# Patient Record
Sex: MALE | Race: WHITE
[De-identification: names, ages, dates, MRNs, and addresses within clinical notes are randomized per-mention and may not be internally consistent; named-entity substitution may affect disease eponyms.]

---

## 2018-03-15 ENCOUNTER — HOSPITAL ENCOUNTER (OUTPATIENT)
Dept: HOSPITAL 53 - M SMT | Age: 53
End: 2018-03-15
Attending: NURSE PRACTITIONER
Payer: COMMERCIAL

## 2018-03-15 ENCOUNTER — HOSPITAL ENCOUNTER (OUTPATIENT)
Dept: HOSPITAL 53 - M SMT | Age: 53
End: 2018-03-15
Attending: INTERNAL MEDICINE
Payer: COMMERCIAL

## 2018-03-15 DIAGNOSIS — I50.9: Primary | ICD-10-CM

## 2018-03-15 DIAGNOSIS — G47.33: Primary | ICD-10-CM

## 2018-03-15 LAB
ALBUMIN/GLOBULIN RATIO: 0.81 (ref 1–1.93)
ALBUMIN: 3.4 GM/DL (ref 3.2–5.2)
ALKALINE PHOSPHATASE: 80 U/L (ref 45–117)
ALT SERPL W P-5'-P-CCNC: 100 U/L (ref 12–78)
ANION GAP: 6 MEQ/L (ref 8–16)
AST SERPL-CCNC: 73 U/L (ref 7–37)
BILIRUBIN,TOTAL: 0.5 MG/DL (ref 0.2–1)
BLOOD UREA NITROGEN: 15 MG/DL (ref 7–18)
CALCIUM LEVEL: 8.8 MG/DL (ref 8.5–10.1)
CARBON DIOXIDE LEVEL: 32 MEQ/L (ref 21–32)
CHLORIDE LEVEL: 100 MEQ/L (ref 98–107)
CREATININE FOR GFR: 0.99 MG/DL (ref 0.7–1.3)
GFR SERPL CREATININE-BSD FRML MDRD: > 60 ML/MIN/{1.73_M2} (ref 56–?)
GLUCOSE, FASTING: 100 MG/DL (ref 70–100)
HEMATOCRIT: 49 % (ref 42–52)
HEMOGLOBIN: 16.1 G/DL (ref 14–18)
MEAN CORPUSCULAR HEMOGLOBIN: 30.6 PG (ref 27–33)
MEAN CORPUSCULAR HGB CONC: 32.9 G/DL (ref 32–36.5)
MEAN CORPUSCULAR VOLUME: 93 FL (ref 80–96)
NRBC BLD AUTO-RTO: 0 % (ref 0–0)
NT-PRO BNP: 38 PG/ML (ref ?–125)
PLATELET COUNT, AUTOMATED: 230 10^3/UL (ref 150–450)
POTASSIUM SERUM: 4.6 MEQ/L (ref 3.5–5.1)
RED BLOOD COUNT: 5.27 10^6/UL (ref 4.3–6.1)
RED CELL DISTRIBUTION WIDTH: 13.4 % (ref 11.5–14.5)
SODIUM LEVEL: 138 MEQ/L (ref 136–145)
TOTAL PROTEIN: 7.6 GM/DL (ref 6.4–8.2)
WHITE BLOOD COUNT: 6.7 10^3/UL (ref 4–10)

## 2018-03-27 ENCOUNTER — HOSPITAL ENCOUNTER (OUTPATIENT)
Dept: HOSPITAL 53 - M SLEEP | Age: 53
End: 2018-03-27
Attending: INTERNAL MEDICINE
Payer: COMMERCIAL

## 2018-03-27 DIAGNOSIS — G47.33: Primary | ICD-10-CM

## 2018-03-27 PROCEDURE — 95811 POLYSOM 6/>YRS CPAP 4/> PARM: CPT

## 2020-10-07 ENCOUNTER — HOSPITAL ENCOUNTER (INPATIENT)
Dept: HOSPITAL 53 - M ED | Age: 55
LOS: 8 days | Discharge: HOME HEALTH | DRG: 133 | End: 2020-10-15
Attending: INTERNAL MEDICINE | Admitting: INTERNAL MEDICINE
Payer: SELF-PAY

## 2020-10-07 VITALS — DIASTOLIC BLOOD PRESSURE: 65 MMHG | SYSTOLIC BLOOD PRESSURE: 103 MMHG

## 2020-10-07 VITALS — WEIGHT: 315 LBS | HEIGHT: 70 IN | BODY MASS INDEX: 45.1 KG/M2

## 2020-10-07 VITALS — SYSTOLIC BLOOD PRESSURE: 109 MMHG | DIASTOLIC BLOOD PRESSURE: 59 MMHG

## 2020-10-07 VITALS — SYSTOLIC BLOOD PRESSURE: 142 MMHG | DIASTOLIC BLOOD PRESSURE: 90 MMHG

## 2020-10-07 VITALS — DIASTOLIC BLOOD PRESSURE: 71 MMHG | SYSTOLIC BLOOD PRESSURE: 132 MMHG

## 2020-10-07 DIAGNOSIS — D86.9: ICD-10-CM

## 2020-10-07 DIAGNOSIS — R91.8: ICD-10-CM

## 2020-10-07 DIAGNOSIS — Z20.828: ICD-10-CM

## 2020-10-07 DIAGNOSIS — Z99.81: ICD-10-CM

## 2020-10-07 DIAGNOSIS — I50.9: ICD-10-CM

## 2020-10-07 DIAGNOSIS — J96.01: Primary | ICD-10-CM

## 2020-10-07 DIAGNOSIS — E66.2: ICD-10-CM

## 2020-10-07 DIAGNOSIS — E83.39: ICD-10-CM

## 2020-10-07 DIAGNOSIS — I11.0: ICD-10-CM

## 2020-10-07 DIAGNOSIS — J96.02: ICD-10-CM

## 2020-10-07 DIAGNOSIS — Z91.19: ICD-10-CM

## 2020-10-07 LAB
ALBUMIN SERPL BCG-MCNC: 3.1 GM/DL (ref 3.2–5.2)
ALT SERPL W P-5'-P-CCNC: 35 U/L (ref 12–78)
BASE EXCESS BLDA CALC-SCNC: 5.3 MMOL/L (ref -2–2)
BASOPHILS # BLD AUTO: 0 10^3/UL (ref 0–0.2)
BASOPHILS NFR BLD AUTO: 0.4 % (ref 0–1)
BILIRUB CONJ SERPL-MCNC: 0.2 MG/DL (ref 0–0.2)
BILIRUB SERPL-MCNC: 0.5 MG/DL (ref 0.2–1)
CO2 BLDA CALC-SCNC: 38 MEQ/L (ref 22–29)
EOSINOPHIL # BLD AUTO: 0.1 10^3/UL (ref 0–0.5)
EOSINOPHIL NFR BLD AUTO: 0.5 % (ref 0–3)
HCO3 BLDA-SCNC: 35.6 MEQ/L (ref 22–26)
HCO3 STD BLDA-SCNC: 29.3 MEQ/L (ref 22–26)
HCT VFR BLD AUTO: 50.7 % (ref 42–52)
HGB BLD-MCNC: 14.9 G/DL (ref 13.5–17.5)
INR PPP: 1.01
LYMPHOCYTES # BLD AUTO: 1.1 10^3/UL (ref 1.5–5)
LYMPHOCYTES NFR BLD AUTO: 11.9 % (ref 24–44)
MCH RBC QN AUTO: 28.4 PG (ref 27–33)
MCHC RBC AUTO-ENTMCNC: 29.4 G/DL (ref 32–36.5)
MCV RBC AUTO: 96.6 FL (ref 80–96)
MONOCYTES # BLD AUTO: 1.6 10^3/UL (ref 0–0.8)
MONOCYTES NFR BLD AUTO: 16.5 % (ref 0–5)
NEUTROPHILS # BLD AUTO: 6.6 10^3/UL (ref 1.5–8.5)
NEUTROPHILS NFR BLD AUTO: 69.6 % (ref 36–66)
NT-PRO BNP: 1740 PG/ML (ref ?–125)
PCO2 BLDA: 79.2 MMHG (ref 35–45)
PH BLDA: 7.27 UNITS (ref 7.35–7.45)
PLATELET # BLD AUTO: 278 10^3/UL (ref 150–450)
PO2 BLDA: 132.6 MMHG (ref 75–100)
PROT SERPL-MCNC: 8.8 GM/DL (ref 6.4–8.2)
PROTHROMBIN TIME: 13.5 SECONDS (ref 12.5–14.3)
RBC # BLD AUTO: 5.25 10^6/UL (ref 4.3–6.1)
SAO2 % BLDA: 98.5 % (ref 95–99)
T4 SERPL-MCNC: 8.7 UG/DL (ref 4.5–12)
TSH SERPL DL<=0.005 MIU/L-ACNC: 2.24 UIU/ML (ref 0.36–3.74)
WBC # BLD AUTO: 9.5 10^3/UL (ref 4–10)

## 2020-10-07 PROCEDURE — 5A09557 ASSISTANCE WITH RESPIRATORY VENTILATION, GREATER THAN 96 CONSECUTIVE HOURS, CONTINUOUS POSITIVE AIRWAY PRESSURE: ICD-10-PCS | Performed by: INTERNAL MEDICINE

## 2020-10-07 RX ADMIN — DEXTROSE MONOHYDRATE SCH MG: 50 INJECTION, SOLUTION INTRAVENOUS at 21:56

## 2020-10-07 RX ADMIN — SODIUM CHLORIDE SCH UNITS: 4.5 INJECTION, SOLUTION INTRAVENOUS at 21:57

## 2020-10-07 NOTE — HPEPDOC
[facility] Medical History & Physical


Date of Admission


Oct 7, 2020


Date of Service:  Oct 7, 2020





History and Physical


Chief complaint:


Presented to the hospital with complaints of shortness of breath





History of present illness:


Patient is a 55-year-old  male with past medical history of congestive 

heart failure, sleep apnea on CPAP was presented to the emergency room with s

hortness of breath over the last 1 month duration.





Patient reports that over the course of 1 month hes been experience and 

worsening shortness of breath., Shortness of breath was worse with exertion but 

now occurs at rest. Patient denies any chest pain at rest, however, does report 

chest pain with exertion. Patient describes the chest pain is throbbing in 

nature and resolves quickly. Patient has an associated dry cough without any 

expectoration. Has not experience any fevers or chills but does experience 

intermittent lower extremity swelling.





Patient has reported that over the duration 1 year he has lost insurance 

coverage. Since that point he was unable to maintain his home oxygen that he 

bleeds into his CPAP device, so he has been without oxygen and his CPAP for 

greater than 1 year. He is also failed to follow-up with any providers. Patient 

follows with Dr. Lino and Dr. Elizabeth, but has last seen 1 year ago.





Patient reports a mild headache.





Patient denies any nausea, vomiting, abdominal pain, constipation, diarrhea, or 

urinary discomfort. Has not experience any recent fevers or chills.





Past Medical History:


Congestive heart failure, sleep apnea on CPAP





Past Surgical History:


Patient denies any prior surgical history





Allergies:


See below 





Medications:


See below





Family History:


- Mother with history of diabetes and father with a history of emphysema





Social History: 


- Denies the use of tobacco. Reports social alcohol use and has used marijuana 

greater than 1 year ago


- Denies recent travel or sick contacts


- Lives alone


- Occupation; own business in [employer]





Review of Systems:


10 point review of systems complete, all negative otherwise stated in HPI





Physical exam:


- Vitals: BP [128/71], HR [101], RR [32], Sat [71%RA], Temp [98.7F]


- General: Lying in bed, BIPAP on face, but able to speak, AAOx3


- HEENT: NC, AT, PERRLA, + BIPAP Face mask 


- CVS: RRR, +S1S2


- Lungs: Fair air entry bilaterally, No appreciable wheezing / rales / rhonchi 


- Abdomen: Soft, morbidly obese, multiple abdominal folds with areas of 

irritation, nontender


- Extremities: Trace edema at ankles, No calf tenderness


- Neuro: No focal motor or sensory deficit


- Skin: No visible rashes 





Assessment and Plan: 


Acute hypoxic and hypercapnia respiratory failure - possibly 2/2 obesity 

hypoventilation syndrome and non-compliance with oxygen, possibly 2/2 

malignancy, unlikely 2/2 infectious etiology 


- Patient presented to the emergency room complaints of SOB


- On arrival to emergency room, patient appears to have blue lips and was 

cyanotic


- Patient was placed on 100% nonrebreather and subsequently on BiPAP


- ABG noted initially with pCO2 retention; improved on follow up


- Elevated BNP


- No leukocytosis 


- CXR 10/7: 1. Focal consolidation right perihilar region which may be due to 

pneumonia or an underlying mass. 2. Probable subsegmental atelectasis at the 

left lung base.


- Will check ECHO, Strict in/outs, daily weight, fluid restriction, telemetry 


- Will repeat ABG in AM


- Will start Furosemide 40 IV BID


- Will consult pulmonology 


- ICU for continued BIPAP / Continuous pulse oximetry 





Super morbid obesity


- BMI of 71.9


- Complicating medical care


- Physical with areas of abdominal folds with some irritation


- Will start Nystatin powder


- Patient will likely require outpatient follow up with PCP for consideration of

 bariatric surgery 





DVT prophylaxis 


- Will start Heparin





Vital Signs





Vital Signs








  Date Time  Temp Pulse Resp B/P (MAP) Pulse Ox O2 Delivery O2 Flow Rate FiO2


 


10/7/20 16:00        40


 


10/7/20 15:20        


 


10/7/20 14:51 98.7 101 32  71 Room Air  











Laboratory Data


Labs 24H


Laboratory Tests 2


10/7/20 15:06: 


Immature Granulocyte % (Auto) 1.1, Neutrophils (%) (Auto) 69.6H, Lymphocytes (%)

 (Auto) 11.9L, Monocytes (%) (Auto) 16.5H, Eosinophils (%) (Auto) 0.5, Basophils

 (%) (Auto) 0.4, Neutrophils # (Auto) 6.6, Lymphocytes # (Auto) 1.1L, Monocytes 

# (Auto) 1.6H, Eosinophils # (Auto) 0.1, Basophils # (Auto) 0.0, Nucleated Red 

Blood Cells % (auto) 0.5H, Prothrombin Time 13.5, Prothromb Time International 

Ratio 1.01, Lactic Acid Level 2.4*H, Total Bilirubin 0.5, Direct Bilirubin 0.2, 

Aspartate Amino Transf (AST/SGOT) 31, Alanine Aminotransferase (ALT/SGPT) 35, 

Alkaline Phosphatase 108, NT-Pro-B-Type Natriuretic Peptide 1740H, Total Protein

 8.8H, Albumin 3.1L, Albumin/Globulin Ratio 0.5, Thyroid Stimulating Hormone 

(TSH) 2.240, Thyroxine (T4) 8.7


10/7/20 15:12: Magnesium Level 2.3


10/7/20 15:14: 


POC Glucose (Misc Panel) 130H, POC Sodium (Misc Panel) 139, POC Potassium (Misc 

Panel) 4.4, POC Chloride (Misc Panel) 94L, POC Total CO2 (Misc Panel) 33.0H, POC

 Blood Urea Nitrogen (Misc Panel 17, POC Ionized Calcium (Misc Panel) 4.8, POC 

Creatinine (Misc Panel) 0.9, POC Hematocrit (Misc Panel) 52.0H


10/7/20 15:16: POC Troponin I (Misc) 0.02


10/7/20 16:54: 


Blood Gas Bicarbonate Standard 29.3H, Arterial Blood pH 7.270L, Arterial Blood 

Partial Pressure CO2 79.2*H, Arterial Blood Partial Pressure O2 132.6H, Arterial

 Blood Total CO2 38.0H, Arterial Blood HCO3 35.6H, Arterial Blood Base Excess 

5.3H, Arterial Blood Oxygen Saturation 98.5


CBC/BMP


Laboratory Tests


10/7/20 15:06








Microbiology





Microbiology


10/7/20 Blood Culture, Received


          Pending


10/7/20 Blood Culture, Received


          Pending





Home Medications


No Active Prescriptions or Reported Meds





Allergies


Coded Allergies:  


     No Known Allergies (Unverified , 10/7/20)











SHAUN CELESTIN MD                 Oct 7, 2020 17:31

## 2020-10-07 NOTE — REPVR
PROCEDURE INFORMATION: 

Exam: XR Chest, 1 View 

Exam date and time: 10/7/2020 3:06 PM 

Age: 55 years old 

Clinical indication: Dyspnea and shortness of breath; Additional info: 

Dyspnea/cough 



TECHNIQUE: 

Imaging protocol: XR of the chest 

Views: 1 view. 



COMPARISON: 

CR CHEST 2 VIEWS 3/15/2018 11:12 AM 



FINDINGS: 

Lungs: Diffuse interstitial prominence is again noted with superimposed 

consolidation or mass right perihilar region. There may be subsegmental 

atelectasis at the left lung base. 

Pleural space: No right pleural effusion. There may be a left pleural effusion. 

Heart/Mediastinum: Cardiomediastinal silhouette is borderline enlarged which 

may in part be due to AP portable positioning. 

Bones/joints: No significant skeletal findings. 



IMPRESSION: 

1. Focal consolidation right perihilar region which may be due to pneumonia or 

an underlying mass. 

2. Probable subsegmental atelectasis at the left lung base. 



Electronically signed by: Carol Mendiola On 10/07/2020  15:32:09 PM

## 2020-10-07 NOTE — ECGEPIP
[facility] - ED

                                       

                                       Test Date:    2020-10-07

Pat Name:     AYO TELLEZ             Department:   

Patient ID:   [id number]                 Room:         -

Gender:       Male                     Technician:   maty

:          1965               Requested By: JOAQUIN MURPHY

Order Number: [id number]     Reading MD:   Tamra Mauricio

                                 Measurements

Intervals                              Axis          

Rate:         90                       P:            57

KY:           170                      QRS:          11

QRSD:         88                       T:            47

QT:           344                                    

QTc:          423                                    

                           Interpretive Statements

SINUS RHYTHM WITH OCCASIONAL VENTRICULAR PREMATURE COMPLEXES

LOW QRS VOLTAGE IN PRECORDIAL LEADS

PRWP

NO PRIOR

Electronically Signed on 10-7-2020 18:36:07 EDT by Tamra Mauricio

## 2020-10-08 VITALS — SYSTOLIC BLOOD PRESSURE: 131 MMHG | DIASTOLIC BLOOD PRESSURE: 65 MMHG

## 2020-10-08 VITALS — SYSTOLIC BLOOD PRESSURE: 105 MMHG | DIASTOLIC BLOOD PRESSURE: 56 MMHG

## 2020-10-08 VITALS — DIASTOLIC BLOOD PRESSURE: 74 MMHG | SYSTOLIC BLOOD PRESSURE: 129 MMHG

## 2020-10-08 VITALS — SYSTOLIC BLOOD PRESSURE: 135 MMHG | DIASTOLIC BLOOD PRESSURE: 64 MMHG

## 2020-10-08 VITALS — DIASTOLIC BLOOD PRESSURE: 69 MMHG | SYSTOLIC BLOOD PRESSURE: 107 MMHG

## 2020-10-08 VITALS — SYSTOLIC BLOOD PRESSURE: 112 MMHG | DIASTOLIC BLOOD PRESSURE: 63 MMHG

## 2020-10-08 VITALS — DIASTOLIC BLOOD PRESSURE: 62 MMHG | SYSTOLIC BLOOD PRESSURE: 140 MMHG

## 2020-10-08 VITALS — DIASTOLIC BLOOD PRESSURE: 57 MMHG | SYSTOLIC BLOOD PRESSURE: 106 MMHG

## 2020-10-08 VITALS — DIASTOLIC BLOOD PRESSURE: 70 MMHG | SYSTOLIC BLOOD PRESSURE: 99 MMHG

## 2020-10-08 VITALS — DIASTOLIC BLOOD PRESSURE: 64 MMHG | SYSTOLIC BLOOD PRESSURE: 141 MMHG

## 2020-10-08 VITALS — DIASTOLIC BLOOD PRESSURE: 54 MMHG | SYSTOLIC BLOOD PRESSURE: 106 MMHG

## 2020-10-08 VITALS — SYSTOLIC BLOOD PRESSURE: 138 MMHG | DIASTOLIC BLOOD PRESSURE: 63 MMHG

## 2020-10-08 VITALS — SYSTOLIC BLOOD PRESSURE: 151 MMHG | DIASTOLIC BLOOD PRESSURE: 80 MMHG

## 2020-10-08 VITALS — SYSTOLIC BLOOD PRESSURE: 110 MMHG | DIASTOLIC BLOOD PRESSURE: 73 MMHG

## 2020-10-08 VITALS — DIASTOLIC BLOOD PRESSURE: 64 MMHG | SYSTOLIC BLOOD PRESSURE: 131 MMHG

## 2020-10-08 VITALS — SYSTOLIC BLOOD PRESSURE: 120 MMHG | DIASTOLIC BLOOD PRESSURE: 58 MMHG

## 2020-10-08 VITALS — SYSTOLIC BLOOD PRESSURE: 110 MMHG | DIASTOLIC BLOOD PRESSURE: 52 MMHG

## 2020-10-08 VITALS — DIASTOLIC BLOOD PRESSURE: 63 MMHG | SYSTOLIC BLOOD PRESSURE: 146 MMHG

## 2020-10-08 VITALS — SYSTOLIC BLOOD PRESSURE: 144 MMHG | DIASTOLIC BLOOD PRESSURE: 78 MMHG

## 2020-10-08 VITALS — DIASTOLIC BLOOD PRESSURE: 65 MMHG | SYSTOLIC BLOOD PRESSURE: 152 MMHG

## 2020-10-08 VITALS — DIASTOLIC BLOOD PRESSURE: 75 MMHG | SYSTOLIC BLOOD PRESSURE: 163 MMHG

## 2020-10-08 VITALS — DIASTOLIC BLOOD PRESSURE: 62 MMHG | SYSTOLIC BLOOD PRESSURE: 108 MMHG

## 2020-10-08 VITALS — SYSTOLIC BLOOD PRESSURE: 139 MMHG | DIASTOLIC BLOOD PRESSURE: 79 MMHG

## 2020-10-08 VITALS — DIASTOLIC BLOOD PRESSURE: 70 MMHG | SYSTOLIC BLOOD PRESSURE: 152 MMHG

## 2020-10-08 LAB
ALBUMIN SERPL BCG-MCNC: 2.7 GM/DL (ref 3.2–5.2)
ALT SERPL W P-5'-P-CCNC: 31 U/L (ref 12–78)
BASE EXCESS BLDA CALC-SCNC: 7.5 MMOL/L (ref -2–2)
BASE EXCESS BLDA CALC-SCNC: 9.9 MMOL/L (ref -2–2)
BILIRUB SERPL-MCNC: 0.5 MG/DL (ref 0.2–1)
BUN SERPL-MCNC: 15 MG/DL (ref 7–18)
CALCIUM SERPL-MCNC: 7.9 MG/DL (ref 8.5–10.1)
CHLORIDE SERPL-SCNC: 98 MEQ/L (ref 98–107)
CHOLEST SERPL-MCNC: 132 MG/DL (ref ?–200)
CK SERPL-CCNC: 126 U/L (ref 39–308)
CO2 BLDA CALC-SCNC: 41.7 MEQ/L (ref 22–29)
CO2 BLDA CALC-SCNC: 45.8 MEQ/L (ref 22–29)
CO2 SERPL-SCNC: 37 MEQ/L (ref 21–32)
CREAT SERPL-MCNC: 0.88 MG/DL (ref 0.7–1.3)
GFR SERPL CREATININE-BSD FRML MDRD: > 60 ML/MIN/{1.73_M2} (ref 56–?)
GLUCOSE SERPL-MCNC: 103 MG/DL (ref 70–100)
HCO3 BLDA-SCNC: 38.8 MEQ/L (ref 22–26)
HCO3 BLDA-SCNC: 42.5 MEQ/L (ref 22–26)
HCO3 STD BLDA-SCNC: 31.2 MEQ/L (ref 22–26)
HCO3 STD BLDA-SCNC: 33.7 MEQ/L (ref 22–26)
HCT VFR BLD AUTO: 49.1 % (ref 42–52)
HGB BLD-MCNC: 14.1 G/DL (ref 13.5–17.5)
LDH SERPL L TO P-CCNC: 236 U/L (ref 87–241)
MAGNESIUM SERPL-MCNC: 2.1 MG/DL (ref 1.8–2.4)
MCH RBC QN AUTO: 28 PG (ref 27–33)
MCHC RBC AUTO-ENTMCNC: 28.7 G/DL (ref 32–36.5)
MCV RBC AUTO: 97.6 FL (ref 80–96)
PCO2 BLDA: 105.6 MMHG (ref 35–45)
PCO2 BLDA: 93.1 MMHG (ref 35–45)
PH BLDA: 7.22 UNITS (ref 7.35–7.45)
PH BLDA: 7.24 UNITS (ref 7.35–7.45)
PHOSPHATE SERPL-MCNC: 4.3 MG/DL (ref 2.5–4.9)
PLATELET # BLD AUTO: 233 10^3/UL (ref 150–450)
PO2 BLDA: 100.9 MMHG (ref 75–100)
PO2 BLDA: 76.8 MMHG (ref 75–100)
POTASSIUM SERPL-SCNC: 4.3 MEQ/L (ref 3.5–5.1)
PROT SERPL-MCNC: 7.2 GM/DL (ref 6.4–8.2)
RBC # BLD AUTO: 5.03 10^6/UL (ref 4.3–6.1)
SAO2 % BLDA: 94.3 % (ref 95–99)
SAO2 % BLDA: 97 % (ref 95–99)
SODIUM SERPL-SCNC: 138 MEQ/L (ref 136–145)
TRIGL SERPL-MCNC: 107 MG/DL (ref ?–150)
WBC # BLD AUTO: 8 10^3/UL (ref 4–10)

## 2020-10-08 RX ADMIN — SODIUM CHLORIDE SCH UNITS: 4.5 INJECTION, SOLUTION INTRAVENOUS at 15:05

## 2020-10-08 RX ADMIN — DEXTROSE MONOHYDRATE SCH MG: 50 INJECTION, SOLUTION INTRAVENOUS at 17:15

## 2020-10-08 RX ADMIN — DEXTROSE MONOHYDRATE SCH MG: 50 INJECTION, SOLUTION INTRAVENOUS at 10:12

## 2020-10-08 RX ADMIN — SODIUM CHLORIDE SCH UNITS: 4.5 INJECTION, SOLUTION INTRAVENOUS at 21:20

## 2020-10-08 RX ADMIN — SODIUM CHLORIDE SCH UNITS: 4.5 INJECTION, SOLUTION INTRAVENOUS at 06:31

## 2020-10-08 RX ADMIN — DEXTROSE MONOHYDRATE SCH MG: 50 INJECTION, SOLUTION INTRAVENOUS at 21:20

## 2020-10-08 RX ADMIN — DEXTROSE MONOHYDRATE SCH MG: 50 INJECTION, SOLUTION INTRAVENOUS at 08:16

## 2020-10-08 NOTE — CCN
DATE:  10/08/2020



The patient is seen in the intensive care unit on noninvasive positive pressure 
ventilation. This is hospital day #2, intensive care unit (ICU) day #2. Over the
past 12 hours he has tolerated noninvasive ventilation reasonably well. 



At bedside his temperature is 98, pulse rate 85, respirations 24, blood pressure
110/52. Intake and output for the past 24 hours: 30 mL in, 900 mL out, since 
midnight 60 in, 350 out. His weight on admission was 264 kg, weight this morning
261. 



PHYSICAL EXAMINATION:  He is ill appearing. His oral mucosa is pink. Neck is 
supple and quite stout.  His jugular veins are unable to be appreciated. There 
is no stridor. Heart sounds are regular, distant. Breath sounds are coarse and 
diminished. Chest is symmetric. There is no accessory muscle use at rest. 
Abdomen is soft, morbidly obese with some cellulitic-like changes in the skin 
inferiorly. Extremities show some edema. Peripheral pulses are palpable.



Diagnostic studies were reviewed. Chest x-rays and images from his previous CTs 
are once again reviewed. There is a prominence in the interstitium which dates 
back to 2018. The right middle lobe infiltrate appears new.



His sodium is 138, potassium 4.3, chloride 98, CO2 of 37, BUN 15, creatinine 
0.88, glucose 103. White cell count is 8, hemoglobin 14, hematocrit 49, platelet
count 233,000. Magnesium is 2.1, phosphorus 4.3. . His BNP was 1740. AST 
29, ALT 31, albumin 2.7.



Arterial blood gases showed a pH 7.23, pCO2 of 93, pO2 of 76. 



Blood cultures have so far been negative times two. His COVID test was negative.




The primary problem requiring critical attention is acute hypoxic hypercarbic 
respiratory failure. His CO2 is elevated. I will increase the pressure window on
his noninvasive ventilation and follow gas exchange.



Pulmonary edema. We have been unable to secure a Hayes catheter, but his intake 
and output appear negative, and his weight is down. I agree with continuing 
diuresis, and I have discussed with the primary service the possibility of a 
urology consultation.



Sarcoidosis. The patient has a historical diagnosis of sarcoidosis established 
by biopsy in 2010. Reactive of pulmonary sarcoid is a possibility. We will 
obtain an angiotensin-converting enzyme (ACE) level as a possible measure of 
disease activity.



Deep venous thrombosis (DVT) prophylaxis is being addressed with subcutaneous 
heparin and sequential hose.



Ulcer prophylaxis is being addressed with Protonix.



Glycemic control is reasonable at this point. Will continue close monitoring.



There was 48 minutes spent in the provision of bedside critical care and 
coordination exclusive of any procedure time.

[place]

## 2020-10-08 NOTE — CR
CRITICAL CARE CONSULTATION NOTE



DATE:  10/07/2020



SUBJECTIVE:  I was called to see this patient in the intensive care unit, a 
55-year-old  male, with acute hypoxic hypercarbic respiratory failure.



He presented to the emergency department with progressively increasing dyspnea 
over the course of the last two days. There was no cough and no sputum 
production. He has a significant past medical history of obesity, obstructive 
sleep apnea syndrome, sarcoidosis (biopsy 2010).



OBJECTIVE: 

At the bedside, his temperature is 98, pulse rate 78, respirations 26, blood 
pressure 141/80, and he is somnolent. HEENT: His oral mucosa is pink. Airway is 
quite small. Neck is stout. No meningismus. Jugular veins are unable to be seen.
Heart sounds are irregular without appreciable murmur, quite distant. Breath 
sounds are diminished bilaterally with bilateral rales. Abdomen is morbidly 
obese with no appreciable bowel sounds. The extremities are cool, pulses are 
palpable, and nails show no clubbing. There is 1-2 mm of pitting edema at the 
ankle bilaterally. 



DIAGNOSTIC STUDIES: 

Chest x-ray shows prominent interstitial markings consistent with edema. 



His electrolytes are sodium 139, potassium 4.4, chloride 94, CO2 of 33, BUN 17, 
creatinine 0.9. White cell count is 9.5, hemoglobin 14.9, hematocrit 50.7, 
platelet count 377,000. Serum lactate is 2.4. Troponin is less than 0.02. BNP is
1740. Arterial blood gas showed pH of 7.27, pCO2 of 72, pO2 of 132. 



ASSESSMENT AND PLAN: 

The primary problem requiring critical attention is acute hypoxic hypercarbic 
respiratory failure. We will initiate noninvasive positive pressure ventilation 
and follow gas exchange to determine if formal intubation and mechanical 
ventilatory support will be necessary.  



Pulmonary edema  Diuresis has been initiated. Hayes catheter is in place. We 
will watch urine output closely and aggressively diuresis to reduce his 
intravascular fluid volume.



Obesity hypoventilation syndrome  The patient will likely require home BiPAP. 



Deep vein thrombosis (DVT) prophylaxis  This is being addressed with heparin. We
will add sequential hose as his risk for DVT is great.



Ulcer prophylaxis  This will be addressed with Protonix. 



I have reviewed the case with the attending hospitalist and with the ICU team. 
We will facility transfer now from the emergency department to the ICU for close
monitoring. His condition is critical. Prognosis is guarded. 



One hour and 27 minutes were spent in the provision of bedside critical care and
coordination exclusive of procedure time.  

MTDD

## 2020-10-08 NOTE — IPNPDOC
Text Note


Date of Service


The patient was seen on 10/8/20.





NOTE


Subjective:


Patient is a 55-year-old  male with past medical history of congestive 

heart failure, sleep apnea on CPAP was presented to the emergency room with 

shortness of breath over the last 1 month duration. Patient reports that over 

the course of 1 month hes been experience and worsening shortness of breath., 

Shortness of breath was worse with exertion but now occurs at rest. Patient 

denies any chest pain at rest, however, does report chest pain with exertion. 

Patient describes the chest pain is throbbing in nature and resolves quickly. 

Patient has an associated dry cough without any expectoration. Has not 

experience any fevers or chills but does experience intermittent lower extremity

swelling.





Patient has reported that over the duration of 1 year he has lost insurance 

coverage. Since that point he was unable to maintain his home oxygen that he 

bleeds into his CPAP device, so he has been without oxygen and his CPAP for 

greater than 1 year. He is also failed to follow-up with any providers. Patient 

follows with Dr. Lino and Dr. Elizabeth, but has last seen 1 year ago.





Patient was seen and examined at the bedside. Currently patient reports that he 

feels fine. Denies any nausea, vomiting, chest pain, palpitations, cough, 

abdominal pain, diarrhea or constipation.





Objective:


Vitals (See below)


General: Lying in bed, appears comfortable, AAOx3


HEENT: NC, AT, + BIPAP mask 


CVS: +S1S2


Lungs: Fair air entry b/l, no appreciable wheezing, rhonchi or rales


Abdomen: Soft, ND, NT


Extremities: Trace to no pitting edema, - Calf tenderness





Assessment and plan:


Acute hypoxic and hypercapnia respiratory failure - possibly 2/2 obesity 

hypoventilation syndrome and non-compliance with oxygen, possibly 2/2 

malignancy, unlikely 2/2 infectious etiology 


- Presented to ER with SOB and was found to have blue lips and was cyanotic


- Patient was placed on 100% nonrebreather and subsequently on BiPAP


- ABG noted initially with pCO2 retention; improved initially; repeat this AM 

with again retention 


- Elevated BNP


- No leukocytosis 


- CXR 10/7: 1. Focal consolidation right perihilar region which may be due to 

pneumonia or an underlying mass. 2. Probable subsegmental atelectasis at the 

left lung base.


- ECHO report pending, Strict in/outs, daily weight, fluid restriction, 

telemetry 


- c/w Furosemide 40 IV BID


- Pulmonology on consultation; appreciate their input 


- Working with PFS to ensure that he gets insurance coverage 





Right lung opacity - possibly 2/2 malignancy, possibly 2/2 sarcoidosis


- Has had biopsy completed in the past that was consistent with Sarcoidosis


- Will likely require repeat imaging


- Unable to obtain CT chest given his obesity 





Super morbid obesity


- BMI of 82.8


- Complicating medical care


- Physical with areas of abdominal folds with some irritation


- c/w Nystatin powder


- Patient will likely require outpatient follow up with PCP for consideration of

bariatric surgery 





DVT prophylaxis 


- c/w Heparin





VS,Fishbone, I+O


VS, Fishbone, I+O


Laboratory Tests


10/7/20 15:06








10/8/20 04:47











Vital Signs








  Date Time  Temp Pulse Resp B/P (MAP) Pulse Ox O2 Delivery O2 Flow Rate FiO2


 


10/8/20 07:45        40


 


10/8/20 06:00  85  110/52 (71) 92 NIPPV (BIPAP/CPAP)  


 


10/8/20 05:00   23     


 


10/8/20 04:00 98.0       














I&O- Last 24 Hours up to 6 AM 


 


 10/8/20





 06:00


 


Intake Total 90 ml


 


Output Total 1250 ml


 


Balance -1160 ml

















SHAUN CELESTIN MD                 Oct 8, 2020 10:13

## 2020-10-08 NOTE — ECHO
DATE OF PROCEDURE: 10/07/2020 



Age: 55 

Gender: Male  



REFERRING PHYSICIAN: Aftab Crystal M.D. 



PATIENT LOCATION: Intensive care unit (ICU).



REASON FOR STUDY: Shortness of breath.  



2D MEASUREMENTS:

   IVS 1.5 cm

   LV 4.5 cm

   LVPW 1.4 cm

   LA 4.3 cm

   Aorta 3.5 cm



DOPPLER MEASUREMENT

   Peak velocity across the aortic valve 1.8 m/s

   Peak velocity across the LVOT 1.1 m/s

   Peak gradient across the aortic valve 13 mmHg 

   Mitral E 0.8

   Mitral A 1.0 with a ratio of 0.9

   Maximum transverse velocity 3.0 m/s



2D COMMENTS: 

1.  Technically limited study due to poor acoustic window.

2.  The left ventricular size is normal with a mildly increased left ventricular
wall thickness. Left ventricular systolic function is normal, estimated at 60% 
to 65%. 

3.  Mildly enlarged left atrium. The right atrium and the right ventricle appear
to be mildly enlarged. The right ventricle free wall may be hypokinetic. 

4.  The atrial septum appeared to be normal without evidence of defect or shunt.

5.  Normal aortic root.

6.  No pericardial effusion seen. 

7.  Mildly calcified aortic valve, leaflet excursion appears to be normal. 
Mildly calcified mitral annulus with normal anterior mitral valve leaflet 
motion. Normal tricuspid valve. The pulmonic valve appeared to be normal in 
limited views. The proximal pulmonary artery branches were not well visualized. 

8.  The inferior vena cava was not well visualized. 



Doppler detects mild tricuspid regurgitation. The calculated pulmonary artery 
systolic pressure varies between 40 to 50 mmHg. Abnormal relaxation pattern was 
noted across the mitral valve leaflets, as well as the mitral valve annulus 
consistent with features of grade 1 left ventricular diastolic dysfunction. 



IMPRESSION: 

1.  Normal global left ventricular systolic function with mild concentric left 
ventricular hypertrophy. There are some features of left ventricular diastolic 
dysfunction manifested by abnormal relaxation. 

2.  Aortic valve sclerosis with trivial aortic stenosis, but no aortic 
regurgitation.

3.  Mildly enlarged left atrium at 4.3 cm. Mitral annulus calcification 
detected, but no evidence of significant mitral regurgitation or stenosis. 

4.  Mild tricuspid regurgitation with moderate pulmonary hypertension. 

5.  Dilated right heart chambers with some degree of right ventricular systolic 
dysfunction. 

MTDD

## 2020-10-09 VITALS — SYSTOLIC BLOOD PRESSURE: 102 MMHG | DIASTOLIC BLOOD PRESSURE: 56 MMHG

## 2020-10-09 VITALS — DIASTOLIC BLOOD PRESSURE: 50 MMHG | SYSTOLIC BLOOD PRESSURE: 98 MMHG

## 2020-10-09 VITALS — SYSTOLIC BLOOD PRESSURE: 167 MMHG | DIASTOLIC BLOOD PRESSURE: 75 MMHG

## 2020-10-09 VITALS — DIASTOLIC BLOOD PRESSURE: 64 MMHG | SYSTOLIC BLOOD PRESSURE: 127 MMHG

## 2020-10-09 VITALS — SYSTOLIC BLOOD PRESSURE: 140 MMHG | DIASTOLIC BLOOD PRESSURE: 71 MMHG

## 2020-10-09 VITALS — SYSTOLIC BLOOD PRESSURE: 171 MMHG | DIASTOLIC BLOOD PRESSURE: 75 MMHG

## 2020-10-09 VITALS — DIASTOLIC BLOOD PRESSURE: 80 MMHG | SYSTOLIC BLOOD PRESSURE: 148 MMHG

## 2020-10-09 VITALS — SYSTOLIC BLOOD PRESSURE: 171 MMHG | DIASTOLIC BLOOD PRESSURE: 78 MMHG

## 2020-10-09 VITALS — DIASTOLIC BLOOD PRESSURE: 53 MMHG | SYSTOLIC BLOOD PRESSURE: 103 MMHG

## 2020-10-09 VITALS — SYSTOLIC BLOOD PRESSURE: 128 MMHG | DIASTOLIC BLOOD PRESSURE: 67 MMHG

## 2020-10-09 VITALS — SYSTOLIC BLOOD PRESSURE: 110 MMHG | DIASTOLIC BLOOD PRESSURE: 58 MMHG

## 2020-10-09 VITALS — DIASTOLIC BLOOD PRESSURE: 69 MMHG | SYSTOLIC BLOOD PRESSURE: 138 MMHG

## 2020-10-09 VITALS — SYSTOLIC BLOOD PRESSURE: 168 MMHG | DIASTOLIC BLOOD PRESSURE: 72 MMHG

## 2020-10-09 VITALS — DIASTOLIC BLOOD PRESSURE: 62 MMHG | SYSTOLIC BLOOD PRESSURE: 148 MMHG

## 2020-10-09 VITALS — SYSTOLIC BLOOD PRESSURE: 107 MMHG | DIASTOLIC BLOOD PRESSURE: 73 MMHG

## 2020-10-09 VITALS — SYSTOLIC BLOOD PRESSURE: 175 MMHG | DIASTOLIC BLOOD PRESSURE: 80 MMHG

## 2020-10-09 VITALS — DIASTOLIC BLOOD PRESSURE: 67 MMHG | SYSTOLIC BLOOD PRESSURE: 131 MMHG

## 2020-10-09 VITALS — SYSTOLIC BLOOD PRESSURE: 112 MMHG | DIASTOLIC BLOOD PRESSURE: 65 MMHG

## 2020-10-09 VITALS — SYSTOLIC BLOOD PRESSURE: 102 MMHG | DIASTOLIC BLOOD PRESSURE: 66 MMHG

## 2020-10-09 VITALS — SYSTOLIC BLOOD PRESSURE: 110 MMHG | DIASTOLIC BLOOD PRESSURE: 55 MMHG

## 2020-10-09 VITALS — SYSTOLIC BLOOD PRESSURE: 151 MMHG | DIASTOLIC BLOOD PRESSURE: 73 MMHG

## 2020-10-09 VITALS — SYSTOLIC BLOOD PRESSURE: 141 MMHG | DIASTOLIC BLOOD PRESSURE: 73 MMHG

## 2020-10-09 VITALS — SYSTOLIC BLOOD PRESSURE: 153 MMHG | DIASTOLIC BLOOD PRESSURE: 73 MMHG

## 2020-10-09 LAB
ALBUMIN SERPL BCG-MCNC: 2.7 GM/DL (ref 3.2–5.2)
ALT SERPL W P-5'-P-CCNC: 28 U/L (ref 12–78)
BASE EXCESS BLDA CALC-SCNC: 14.7 MMOL/L (ref -2–2)
BASE EXCESS BLDA CALC-SCNC: 16.7 MMOL/L (ref -2–2)
BILIRUB SERPL-MCNC: 0.8 MG/DL (ref 0.2–1)
BUN SERPL-MCNC: 13 MG/DL (ref 7–18)
CALCIUM SERPL-MCNC: 7.9 MG/DL (ref 8.5–10.1)
CHLORIDE SERPL-SCNC: 91 MEQ/L (ref 98–107)
CHOLEST SERPL-MCNC: 142 MG/DL (ref ?–200)
CK SERPL-CCNC: 85 U/L (ref 39–308)
CO2 BLDA CALC-SCNC: 49.7 MEQ/L (ref 22–29)
CO2 BLDA CALC-SCNC: 54.6 MEQ/L (ref 22–29)
CO2 SERPL-SCNC: 44 MEQ/L (ref 21–32)
CREAT SERPL-MCNC: 0.83 MG/DL (ref 0.7–1.3)
GFR SERPL CREATININE-BSD FRML MDRD: > 60 ML/MIN/{1.73_M2} (ref 56–?)
GLUCOSE SERPL-MCNC: 87 MG/DL (ref 70–100)
HCO3 BLDA-SCNC: 46.6 MEQ/L (ref 22–26)
HCO3 BLDA-SCNC: 50.8 MEQ/L (ref 22–26)
HCO3 STD BLDA-SCNC: 38.5 MEQ/L (ref 22–26)
HCO3 STD BLDA-SCNC: 40.8 MEQ/L (ref 22–26)
HCT VFR BLD AUTO: 51.9 % (ref 42–52)
HGB BLD-MCNC: 14.8 G/DL (ref 13.5–17.5)
LDH SERPL L TO P-CCNC: 250 U/L (ref 87–241)
MAGNESIUM SERPL-MCNC: 2.1 MG/DL (ref 1.8–2.4)
MCH RBC QN AUTO: 28.6 PG (ref 27–33)
MCHC RBC AUTO-ENTMCNC: 28.5 G/DL (ref 32–36.5)
MCV RBC AUTO: 100.4 FL (ref 80–96)
PCO2 BLDA: 123.5 MMHG (ref 35–45)
PCO2 BLDA: 98.3 MMHG (ref 35–45)
PH BLDA: 7.23 UNITS (ref 7.35–7.45)
PH BLDA: 7.29 UNITS (ref 7.35–7.45)
PHOSPHATE SERPL-MCNC: 4.2 MG/DL (ref 2.5–4.9)
PLATELET # BLD AUTO: 200 10^3/UL (ref 150–450)
PO2 BLDA: 107.2 MMHG (ref 75–100)
PO2 BLDA: 62.9 MMHG (ref 75–100)
POTASSIUM SERPL-SCNC: 4.3 MEQ/L (ref 3.5–5.1)
PROT SERPL-MCNC: 7.7 GM/DL (ref 6.4–8.2)
RBC # BLD AUTO: 5.17 10^6/UL (ref 4.3–6.1)
SAO2 % BLDA: 91.7 % (ref 95–99)
SAO2 % BLDA: 97.8 % (ref 95–99)
SODIUM SERPL-SCNC: 137 MEQ/L (ref 136–145)
TRIGL SERPL-MCNC: 109 MG/DL (ref ?–150)
WBC # BLD AUTO: 7.8 10^3/UL (ref 4–10)

## 2020-10-09 RX ADMIN — SODIUM CHLORIDE SCH UNITS: 4.5 INJECTION, SOLUTION INTRAVENOUS at 06:57

## 2020-10-09 RX ADMIN — SODIUM CHLORIDE SCH UNITS: 4.5 INJECTION, SOLUTION INTRAVENOUS at 21:14

## 2020-10-09 RX ADMIN — DEXTROSE MONOHYDRATE SCH MG: 50 INJECTION, SOLUTION INTRAVENOUS at 21:10

## 2020-10-09 RX ADMIN — SODIUM CHLORIDE SCH UNITS: 4.5 INJECTION, SOLUTION INTRAVENOUS at 13:31

## 2020-10-09 NOTE — IPNPDOC
Text Note


Date of Service


The patient was seen on 10/9/20.





NOTE


Subjective:


Patient is a 55-year-old  male with past medical history of congestive 

heart failure, sleep apnea on CPAP was presented to the emergency room with 

shortness of breath over the last 1 month duration. Patient reports that over 

the course of 1 month hes been experience and worsening shortness of breath., 

Shortness of breath was worse with exertion but now occurs at rest. Patient 

denies any chest pain at rest, however, does report chest pain with exertion. 

Patient describes the chest pain is throbbing in nature and resolves quickly. 

Patient has an associated dry cough without any expectoration. Has not 

experience any fevers or chills but does experience intermittent lower extremity

swelling.





Patient has reported that over the duration of 1 year he has lost insurance 

coverage. Since that point he was unable to maintain his home oxygen that he 

bleeds into his CPAP device, so he has been without oxygen and his CPAP for 

greater than 1 year. He is also failed to follow-up with any providers. Patient 

follows with Dr. Lino and Dr. Elizabeth, but has last seen 1 year ago.





Patient was seen and examined at the bedside. Patient was slightly confused this

morning, however was oriented on exam. Denied any chest pain, cough, N/V, abdomi

nal pain, C/D or urinary discomfort. 





Objective:


Vitals (See below)


General: Laying flat in bed, NAD, AAOx3


HEENT: Atraumatic, + BIPAP mask 


CVS: +S1S2


Lungs: Diminished lung sounds, no evidence of rhonchi, rales or wheezing


Abdomen: Soft, nondistended, nontender, significance obesity


Extremities: No pitting edema, - Calf tenderness





Assessment and plan:


Acute hypoxic and hypercapnia respiratory failure - possibly 2/2 obesity 

hypoventilation syndrome and non-compliance with oxygen, possibly 2/2 

malignancy, unlikely 2/2 infectious etiology 


- Presented to ER with SOB and was found to have blue lips and was cyanotic; 

Patient was placed on 100% nonrebreather and subsequently on BiPAP


- This morning patient was confused because elevated PCO2; has since improved


- Elevated BNP


- No leukocytosis 


- CXR 10/7: 1. Focal consolidation right perihilar region which may be due to 

pneumonia or an underlying mass. 2. Probable subsegmental atelectasis at the 

left lung base.


- ECHO report pending, Strict in/outs, daily weight, fluid restriction, te

lemetry 


- s/p Furosemide 


- Pulmonology on consultation; appreciate their input 


- Working with PFS to ensure that he gets insurance coverage 





Right lung opacity - possibly 2/2 malignancy, possibly 2/2 sarcoidosis


- Has had biopsy completed in the past that was consistent with Sarcoidosis


- Will likely require repeat imaging


- Unable to obtain CT chest given his obesity 





HTN


- Blood pressure slightly elevated


- Furosemide has been discontinued


- Patient has been started on amlodipine with holding parameters





Super morbid obesity


- BMI of 82.8


- Complicating medical care


- Physical with areas of abdominal folds with some irritation


- c/w Nystatin powder


- Patient will likely require outpatient follow up with PCP for consideration of

bariatric surgery 





DVT prophylaxis 


- c/w Heparin





Disposition: 


- Working with PFS to establish outpatient insurance coverage; to ensure patient

can get oxygen/possible BiPAP





VS,Fishbone, I+O


VS, Fishbone, I+O


Laboratory Tests


10/9/20 04:31











Vital Signs








  Date Time  Temp Pulse Resp B/P (MAP) Pulse Ox O2 Delivery O2 Flow Rate FiO2


 


10/9/20 09:00  80 24 112/65 (81) 91 NIPPV (BIPAP/CPAP)  30


 


10/9/20 08:00 98.2       














I&O- Last 24 Hours up to 6 AM 


 


 10/9/20





 06:00


 


Intake Total 410 ml


 


Output Total 3375 ml


 


Balance -2965 ml

















SHAUN CELESTIN MD                 Oct 9, 2020 09:53

## 2020-10-10 VITALS — SYSTOLIC BLOOD PRESSURE: 129 MMHG | DIASTOLIC BLOOD PRESSURE: 59 MMHG

## 2020-10-10 VITALS — DIASTOLIC BLOOD PRESSURE: 67 MMHG | SYSTOLIC BLOOD PRESSURE: 108 MMHG

## 2020-10-10 VITALS — DIASTOLIC BLOOD PRESSURE: 59 MMHG | SYSTOLIC BLOOD PRESSURE: 120 MMHG

## 2020-10-10 VITALS — DIASTOLIC BLOOD PRESSURE: 59 MMHG | SYSTOLIC BLOOD PRESSURE: 107 MMHG

## 2020-10-10 VITALS — SYSTOLIC BLOOD PRESSURE: 109 MMHG | DIASTOLIC BLOOD PRESSURE: 59 MMHG

## 2020-10-10 VITALS — DIASTOLIC BLOOD PRESSURE: 68 MMHG | SYSTOLIC BLOOD PRESSURE: 129 MMHG

## 2020-10-10 VITALS — SYSTOLIC BLOOD PRESSURE: 112 MMHG | DIASTOLIC BLOOD PRESSURE: 73 MMHG

## 2020-10-10 VITALS — SYSTOLIC BLOOD PRESSURE: 116 MMHG | DIASTOLIC BLOOD PRESSURE: 66 MMHG

## 2020-10-10 VITALS — SYSTOLIC BLOOD PRESSURE: 127 MMHG | DIASTOLIC BLOOD PRESSURE: 58 MMHG

## 2020-10-10 VITALS — DIASTOLIC BLOOD PRESSURE: 65 MMHG | SYSTOLIC BLOOD PRESSURE: 130 MMHG

## 2020-10-10 VITALS — DIASTOLIC BLOOD PRESSURE: 84 MMHG | SYSTOLIC BLOOD PRESSURE: 148 MMHG

## 2020-10-10 VITALS — SYSTOLIC BLOOD PRESSURE: 124 MMHG | DIASTOLIC BLOOD PRESSURE: 56 MMHG

## 2020-10-10 VITALS — DIASTOLIC BLOOD PRESSURE: 60 MMHG | SYSTOLIC BLOOD PRESSURE: 109 MMHG

## 2020-10-10 VITALS — SYSTOLIC BLOOD PRESSURE: 133 MMHG | DIASTOLIC BLOOD PRESSURE: 74 MMHG

## 2020-10-10 VITALS — DIASTOLIC BLOOD PRESSURE: 58 MMHG | SYSTOLIC BLOOD PRESSURE: 102 MMHG

## 2020-10-10 VITALS — DIASTOLIC BLOOD PRESSURE: 55 MMHG | SYSTOLIC BLOOD PRESSURE: 99 MMHG

## 2020-10-10 VITALS — DIASTOLIC BLOOD PRESSURE: 87 MMHG | SYSTOLIC BLOOD PRESSURE: 151 MMHG

## 2020-10-10 VITALS — SYSTOLIC BLOOD PRESSURE: 116 MMHG | DIASTOLIC BLOOD PRESSURE: 61 MMHG

## 2020-10-10 VITALS — DIASTOLIC BLOOD PRESSURE: 61 MMHG | SYSTOLIC BLOOD PRESSURE: 114 MMHG

## 2020-10-10 VITALS — DIASTOLIC BLOOD PRESSURE: 55 MMHG | SYSTOLIC BLOOD PRESSURE: 112 MMHG

## 2020-10-10 VITALS — DIASTOLIC BLOOD PRESSURE: 71 MMHG | SYSTOLIC BLOOD PRESSURE: 118 MMHG

## 2020-10-10 VITALS — SYSTOLIC BLOOD PRESSURE: 116 MMHG | DIASTOLIC BLOOD PRESSURE: 49 MMHG

## 2020-10-10 VITALS — SYSTOLIC BLOOD PRESSURE: 113 MMHG | DIASTOLIC BLOOD PRESSURE: 59 MMHG

## 2020-10-10 VITALS — SYSTOLIC BLOOD PRESSURE: 107 MMHG | DIASTOLIC BLOOD PRESSURE: 63 MMHG

## 2020-10-10 LAB
ALBUMIN SERPL BCG-MCNC: 2.6 GM/DL (ref 3.2–5.2)
ALT SERPL W P-5'-P-CCNC: 23 U/L (ref 12–78)
BASE EXCESS BLDA CALC-SCNC: 11.1 MMOL/L (ref -2–2)
BILIRUB SERPL-MCNC: 0.7 MG/DL (ref 0.2–1)
BUN SERPL-MCNC: 14 MG/DL (ref 7–18)
CALCIUM SERPL-MCNC: 8.4 MG/DL (ref 8.5–10.1)
CHLORIDE SERPL-SCNC: 91 MEQ/L (ref 98–107)
CHOLEST SERPL-MCNC: 132 MG/DL (ref ?–200)
CK SERPL-CCNC: 101 U/L (ref 39–308)
CO2 BLDA CALC-SCNC: 42 MEQ/L (ref 22–29)
CO2 SERPL-SCNC: 42 MEQ/L (ref 21–32)
CREAT SERPL-MCNC: 0.72 MG/DL (ref 0.7–1.3)
GFR SERPL CREATININE-BSD FRML MDRD: > 60 ML/MIN/{1.73_M2} (ref 56–?)
GLUCOSE SERPL-MCNC: 88 MG/DL (ref 70–100)
HCO3 BLDA-SCNC: 39.8 MEQ/L (ref 22–26)
HCO3 STD BLDA-SCNC: 34.8 MEQ/L (ref 22–26)
HCT VFR BLD AUTO: 48.3 % (ref 42–52)
HGB BLD-MCNC: 14 G/DL (ref 13.5–17.5)
LDH SERPL L TO P-CCNC: 182 U/L (ref 87–241)
MAGNESIUM SERPL-MCNC: 2.1 MG/DL (ref 1.8–2.4)
MCH RBC QN AUTO: 28.3 PG (ref 27–33)
MCHC RBC AUTO-ENTMCNC: 29 G/DL (ref 32–36.5)
MCV RBC AUTO: 97.6 FL (ref 80–96)
PCO2 BLDA: 71.4 MMHG (ref 35–45)
PH BLDA: 7.36 UNITS (ref 7.35–7.45)
PHOSPHATE SERPL-MCNC: 2 MG/DL (ref 2.5–4.9)
PLATELET # BLD AUTO: 222 10^3/UL (ref 150–450)
PO2 BLDA: 72.4 MMHG (ref 75–100)
POTASSIUM SERPL-SCNC: 4.2 MEQ/L (ref 3.5–5.1)
PROT SERPL-MCNC: 7.7 GM/DL (ref 6.4–8.2)
RBC # BLD AUTO: 4.95 10^6/UL (ref 4.3–6.1)
SAO2 % BLDA: 94.7 % (ref 95–99)
SODIUM SERPL-SCNC: 137 MEQ/L (ref 136–145)
TRIGL SERPL-MCNC: 118 MG/DL (ref ?–150)
WBC # BLD AUTO: 7.9 10^3/UL (ref 4–10)

## 2020-10-10 RX ADMIN — SODIUM CHLORIDE SCH UNITS: 4.5 INJECTION, SOLUTION INTRAVENOUS at 21:11

## 2020-10-10 RX ADMIN — SODIUM CHLORIDE SCH UNITS: 4.5 INJECTION, SOLUTION INTRAVENOUS at 14:11

## 2020-10-10 RX ADMIN — DEXTROSE MONOHYDRATE SCH MG: 50 INJECTION, SOLUTION INTRAVENOUS at 21:11

## 2020-10-10 RX ADMIN — SODIUM CHLORIDE SCH UNITS: 4.5 INJECTION, SOLUTION INTRAVENOUS at 06:20

## 2020-10-10 NOTE — IPNPDOC
Text Note


Date of Service


The patient was seen on 10/10/20.





NOTE


Subjective:


Patient is a 55-year-old  male with past medical history of congestive 

heart failure, sleep apnea on CPAP was presented to the emergency room with 

shortness of breath over the last 1 month duration. Patient reports that over 

the course of 1 month hes been experience and worsening shortness of breath., 

Shortness of breath was worse with exertion but now occurs at rest. Patient 

denies any chest pain at rest, however, does report chest pain with exertion. 

Patient describes the chest pain is throbbing in nature and resolves quickly. 

Patient has an associated dry cough without any expectoration. Has not 

experience any fevers or chills but does experience intermittent lower extremity

swelling.





Patient has reported that over the duration of 1 year he has lost insurance 

coverage. Since that point he was unable to maintain his home oxygen that he 

bleeds into his CPAP device, so he has been without oxygen and his CPAP for 

greater than 1 year. He is also failed to follow-up with any providers. Patient 

follows with Dr. Lino and Dr. Elizabeth, but has last seen 1 year ago.





Patient was seen and examined at the bedside. . He was seen sitting up at the 

edge of the bed. Reports that his breathing is doing better. Denies any cough or

chest pain. Has not experience any nausea, vomiting, abdominal pain, diarrhea, 

or urinary discomfort. 





Objective:


Vitals (See below)


General: Sitting up at the edge of the bed, is not appears to be in any acute 

distress, appears comfortable, AAOx3


HEENT: NC, AT, face mask in place


CVS: +S1S2


Lungs: Air entry is fair bilaterally without evidence of rhonchi, crackles or 

wheezing


Abdomen: significant obesity none. Distended/nontender


Extremities: LE are without edema , - Calf tenderness





Assessment and plan:


Acute hypoxic and hypercapnia respiratory failure - possibly 2/2 obesity 

hypoventilation syndrome and non-compliance with oxygen, possibly 2/2 pulmonary 

HTN, possibly 2/2 malignancy, unlikely 2/2 infectious etiology 


- On arrival to ER, patient was SOB, had blue lips - cyanotic; was placed on 

100% nonrebreather, then on BiPAP


- ABG noted; improved 


- No leukocytosis 


- CXR 10/7: 1. Focal consolidation right perihilar region which may be due to 

pneumonia or an underlying mass. 2. Probable subsegmental atelectasis at the 

left lung base.


- ECHO 10/7: Features of diastolic dysfunction, mild tricuspid regurg with 

moderate pulmonary hypertension, dilated right heart chambers with some degree 

of right ventricular systolic dysfunction


- Strict in/outs, daily weight, fluid restriction, telemetry 


- s/p Furosemide 


- Pulmonology on consultation; appreciate their input 


- Working with PFS to ensure that he gets insurance coverage 





Positive blood cultures - possibly 2/2 contaminant


- Currently patient is hemodynamically stable and afebrile


- No leukocytosis


- Will repeat Blood cultures


- Hold of on antibiotics at this time 





Right lung opacity - possibly 2/2 malignancy, possibly 2/2 sarcoidosis


- Has had biopsy completed in the past that was consistent with Sarcoidosis


- Will likely require repeat imaging; however unable to obtain CT chest given 

his obesity 





HTN


- Blood pressure slightly elevated


- s/p Furosemide 


- c/w amlodipine with holding parameters





Super morbid obesity


- BMI of 82.8


- Complicating medical care


- Physical with areas of abdominal folds with some irritation


- c/w Nystatin powder


- Patient will likely require outpatient follow up with PCP for consideration of

bariatric surgery 





DVT prophylaxis 


- c/w Heparin





Disposition: 


- Working with PFS to establish outpatient insurance coverage; to ensure patient

can get oxygen/possible BiPAP


- Possible trial off BIPAP today





VS,Fishbone, I+O


VS, Fishbone, I+O


Laboratory Tests


10/10/20 03:52











Vital Signs








  Date Time  Temp Pulse Resp B/P (MAP) Pulse Ox O2 Delivery O2 Flow Rate FiO2


 


10/10/20 09:00  95  116/66 (83) 92 Nasal Cannula 2.0 


 


10/10/20 08:15        21


 


10/10/20 08:00   26     


 


10/10/20 07:00 98.5       














I&O- Last 24 Hours up to 6 AM 


 


 10/10/20





 06:00


 


Intake Total 240 ml


 


Output Total 425 ml


 


Balance -185 ml

















SHAUN CELESTIN MD                Oct 10, 2020 10:37

## 2020-10-11 VITALS — SYSTOLIC BLOOD PRESSURE: 111 MMHG | DIASTOLIC BLOOD PRESSURE: 62 MMHG

## 2020-10-11 VITALS — DIASTOLIC BLOOD PRESSURE: 53 MMHG | SYSTOLIC BLOOD PRESSURE: 94 MMHG

## 2020-10-11 VITALS — DIASTOLIC BLOOD PRESSURE: 75 MMHG | SYSTOLIC BLOOD PRESSURE: 120 MMHG

## 2020-10-11 VITALS — DIASTOLIC BLOOD PRESSURE: 54 MMHG | SYSTOLIC BLOOD PRESSURE: 94 MMHG

## 2020-10-11 VITALS — DIASTOLIC BLOOD PRESSURE: 69 MMHG | SYSTOLIC BLOOD PRESSURE: 113 MMHG

## 2020-10-11 VITALS — DIASTOLIC BLOOD PRESSURE: 55 MMHG | SYSTOLIC BLOOD PRESSURE: 115 MMHG

## 2020-10-11 VITALS — DIASTOLIC BLOOD PRESSURE: 56 MMHG | SYSTOLIC BLOOD PRESSURE: 100 MMHG

## 2020-10-11 VITALS — DIASTOLIC BLOOD PRESSURE: 62 MMHG | SYSTOLIC BLOOD PRESSURE: 117 MMHG

## 2020-10-11 VITALS — SYSTOLIC BLOOD PRESSURE: 115 MMHG | DIASTOLIC BLOOD PRESSURE: 58 MMHG

## 2020-10-11 VITALS — SYSTOLIC BLOOD PRESSURE: 108 MMHG | DIASTOLIC BLOOD PRESSURE: 52 MMHG

## 2020-10-11 VITALS — SYSTOLIC BLOOD PRESSURE: 122 MMHG | DIASTOLIC BLOOD PRESSURE: 67 MMHG

## 2020-10-11 VITALS — SYSTOLIC BLOOD PRESSURE: 107 MMHG | DIASTOLIC BLOOD PRESSURE: 57 MMHG

## 2020-10-11 VITALS — DIASTOLIC BLOOD PRESSURE: 50 MMHG | SYSTOLIC BLOOD PRESSURE: 102 MMHG

## 2020-10-11 VITALS — DIASTOLIC BLOOD PRESSURE: 72 MMHG | SYSTOLIC BLOOD PRESSURE: 113 MMHG

## 2020-10-11 VITALS — SYSTOLIC BLOOD PRESSURE: 131 MMHG | DIASTOLIC BLOOD PRESSURE: 67 MMHG

## 2020-10-11 VITALS — DIASTOLIC BLOOD PRESSURE: 47 MMHG | SYSTOLIC BLOOD PRESSURE: 89 MMHG

## 2020-10-11 VITALS — DIASTOLIC BLOOD PRESSURE: 75 MMHG | SYSTOLIC BLOOD PRESSURE: 122 MMHG

## 2020-10-11 VITALS — DIASTOLIC BLOOD PRESSURE: 62 MMHG | SYSTOLIC BLOOD PRESSURE: 118 MMHG

## 2020-10-11 VITALS — DIASTOLIC BLOOD PRESSURE: 70 MMHG | SYSTOLIC BLOOD PRESSURE: 123 MMHG

## 2020-10-11 VITALS — SYSTOLIC BLOOD PRESSURE: 114 MMHG | DIASTOLIC BLOOD PRESSURE: 68 MMHG

## 2020-10-11 VITALS — SYSTOLIC BLOOD PRESSURE: 127 MMHG | DIASTOLIC BLOOD PRESSURE: 65 MMHG

## 2020-10-11 VITALS — SYSTOLIC BLOOD PRESSURE: 89 MMHG | DIASTOLIC BLOOD PRESSURE: 54 MMHG

## 2020-10-11 LAB
BASE EXCESS BLDA CALC-SCNC: 12.2 MMOL/L (ref -2–2)
BUN SERPL-MCNC: 17 MG/DL (ref 7–18)
CALCIUM SERPL-MCNC: 8.4 MG/DL (ref 8.5–10.1)
CHLORIDE SERPL-SCNC: 95 MEQ/L (ref 98–107)
CO2 BLDA CALC-SCNC: 44.1 MEQ/L (ref 22–29)
CO2 SERPL-SCNC: 42 MEQ/L (ref 21–32)
CREAT SERPL-MCNC: 0.82 MG/DL (ref 0.7–1.3)
GFR SERPL CREATININE-BSD FRML MDRD: > 60 ML/MIN/{1.73_M2} (ref 56–?)
GLUCOSE SERPL-MCNC: 90 MG/DL (ref 70–100)
HCO3 BLDA-SCNC: 41.7 MEQ/L (ref 22–26)
HCO3 STD BLDA-SCNC: 36 MEQ/L (ref 22–26)
HCT VFR BLD AUTO: 47 % (ref 42–52)
HGB BLD-MCNC: 13.8 G/DL (ref 13.5–17.5)
MAGNESIUM SERPL-MCNC: 2.1 MG/DL (ref 1.8–2.4)
MCH RBC QN AUTO: 28.2 PG (ref 27–33)
MCHC RBC AUTO-ENTMCNC: 29.4 G/DL (ref 32–36.5)
MCV RBC AUTO: 95.9 FL (ref 80–96)
PCO2 BLDA: 79.1 MMHG (ref 35–45)
PH BLDA: 7.34 UNITS (ref 7.35–7.45)
PHOSPHATE SERPL-MCNC: 3.2 MG/DL (ref 2.5–4.9)
PLATELET # BLD AUTO: 198 10^3/UL (ref 150–450)
PO2 BLDA: 135.2 MMHG (ref 75–100)
POTASSIUM SERPL-SCNC: 3.6 MEQ/L (ref 3.5–5.1)
RBC # BLD AUTO: 4.9 10^6/UL (ref 4.3–6.1)
SAO2 % BLDA: 98.8 % (ref 95–99)
SODIUM SERPL-SCNC: 138 MEQ/L (ref 136–145)
WBC # BLD AUTO: 5.6 10^3/UL (ref 4–10)

## 2020-10-11 RX ADMIN — SODIUM CHLORIDE SCH UNITS: 4.5 INJECTION, SOLUTION INTRAVENOUS at 14:59

## 2020-10-11 RX ADMIN — DEXTROSE MONOHYDRATE SCH MG: 50 INJECTION, SOLUTION INTRAVENOUS at 20:39

## 2020-10-11 RX ADMIN — SODIUM CHLORIDE SCH UNITS: 4.5 INJECTION, SOLUTION INTRAVENOUS at 22:30

## 2020-10-11 RX ADMIN — SODIUM CHLORIDE SCH UNITS: 4.5 INJECTION, SOLUTION INTRAVENOUS at 05:34

## 2020-10-11 NOTE — CCN
DATE:  10/10/2020



CRITICAL CARE TIME: 51 minutes. This excludes all procedures.  



SUBJECTIVE:  Mr. Adan remains on BiLevel noninvasive therapy currently. 
Arterial blood gases still pending. He has had no new events overnight. No 
arrhythmias. He is awake and alert this morning. He is able to tell me he does 
not feel well. When asked to elaborate on this, he states his butt hurts. He 
denies any chest discomfort. No lower extremity pain. He has had no cough, 
fever, or chills. 



OBJECTIVE: 

VITAL SIGNS: Temperature 98.5, pulse 84, respiratory rate 22, blood pressure is 
116/49. Oxygen saturation is 96% on 0.21 FiO2 currently on 26/14. 

GENERAL: Awake and alert, laying in bed. 

HEENT: Sclerae clear and anicteric. Pupils equal and reactive to light. Mucous 
membranes are moist. Mallampati 4. Tongue is midline. 

NECK: Large in circumference, difficult to assess jugular venous pressure (JVP).


LYMPATICS: No cervical, supraclavicular, or axillary adenopathy. 

CARDIAC: Distant S1, S2 without audible murmur, rub, or gallop. 

PULMONARY: Clear to auscultation thought decreased air entry bilaterally. 

ABDOMEN: Obese with dependent edema. In the lower abdomen some minimal erythema 
of the pannus because of the dependent edema. No discernable mass or 
hepatosplenomegaly, although this is difficult to assess due to his body size.

EXTREMITIES: No cyanosis, clubbing, or edema. 

SKIN: Pale without rash, jaundice, or bruising.

MUSCULOSKELETAL: Has muscle weakness. Unable to support his own weight to adjust
to a sitting position. We did adjust him to a sitting position today. Will 
attempt to mobilize the patient as much as possible despite being on ventilation
in order to decrease muscle atrophy. 

NEUROLOGIC: No unilateral weakness. No further asterixis that was present 
yesterday. 



LABORATORY EVALUATION: Arterial blood gases still pending. Sodium is 137, 
potassium is 4.2, chloride is 91, bicarb of 42, BUN of 14, creatinine of 0.72, 
and a glucose of 88. Phos is low at 2.0. White blood cell count is 7.9, 
hemoglobin 14.0 with a platelet count of 222,000. 



ASSESSMENT/PLAN:

1.  Acute on chronic hypercarbic hypoxic respiratory failure likely secondary to
obesity hypoventilation. At this point in time, there is no obvious trigger for 
his respiratory decline, likely noncompliance with therapy at home due to 
insurance coverage. Recommend maintaining BiLevel until arterial blood gas is 
compensated. Once this compensated, will perform trials off BiLevel especially 
to provide nutrition. 

2.  Deep vein thrombosis (DVT) prophylaxis on heparin.

3.  Hypophosphatemia. Replaced p.o. 

4.  Gastrointestinal (GI) prophylaxis on Protonix.  

5.  Hypertension. Blood pressure better controlled today. 

6.  Recent volume overload still likely has some volume overload; however, in 
order to compensate for his CO2 retention, diuresis is on hold at this point in 
time, but likely will need extensive diuresis before discharge. 

MTDD

## 2020-10-11 NOTE — IPNPDOC
Text Note


Date of Service


The patient was seen on 10/11/20.





NOTE


Subjective:


Patient is a 55-year-old  male with past medical history of congestive 

heart failure, sleep apnea on CPAP was presented to the emergency room with 

shortness of breath over the last 1 month duration. Patient reports that over 

the course of 1 month hes been experience and worsening shortness of breath., 

Shortness of breath was worse with exertion but now occurs at rest. Patient 

denies any chest pain at rest, however, does report chest pain with exertion. 

Patient describes the chest pain is throbbing in nature and resolves quickly. 

Patient has an associated dry cough without any expectoration. Has not 

experience any fevers or chills but does experience intermittent lower extremity

swelling.





Patient has reported that over the duration of 1 year he has lost insurance 

coverage. Since that point he was unable to maintain his home oxygen that he 

bleeds into his CPAP device, so he has been without oxygen and his CPAP for 

greater than 1 year. He is also failed to follow-up with any providers. Patient 

follows with Dr. Lino and Dr. Elizabeth, but has last seen 1 year ago.





Patient was seen and examined at the bedside. Currently patient is laying in 

bed, does not appear to be in any acute distress. Reports that his breathing is 

doing fine. Denies any cough or chest pain. Denies any nausea, vomiting, 

abdominal pain, diarrhea, or discomfort with urination.





Objective:


Vitals (See below)


General: Lying in bed, does not appear to be in any acute distress, comfortable,

awake, alert and oriented 3


HEENT: Normocephalic and atraumatic. Patient has BiPAP facemask in place


CVS: +S1S2


Lungs: Air entry appears to be symmetric. No evidence of wheezing, crackles or 

rhonchi


Abdomen: significant obesity, no distention / nontender


Extremities: No edema of LE, - Calf tenderness





Assessment and plan:


Acute hypoxic and hypercapnia respiratory failure - possibly 2/2 obesity 

hypoventilation syndrome and non-compliance with oxygen, possibly 2/2 pulmonary 

HTN, possibly 2/2 malignancy, unlikely 2/2 infectious etiology 


- On arrival to ER, patient was SOB, had blue lips - cyanotic; was placed on 

100% nonrebreather, then on BiPAP


- ABG noted - remains relatively stable 


- No leukocytosis 


- CXR 10/7: 1. Focal consolidation right perihilar region which may be due to 

pneumonia or an underlying mass. 2. Probable subsegmental atelectasis at the 

left lung base.


- ECHO 10/7: Features of diastolic dysfunction, mild tricuspid regurgitation 

with moderate pulmonary hypertension, dilated right heart chambers with some 

degree of right ventricular systolic dysfunction


- c/w Strict in/outs, daily weight, fluid restriction, telemetry 


- s/p Furosemide and Amlodipine 


- Pulmonology on consultation; appreciate their input 


- PFS on consult to help ensure insurance coverage / equipment coverage as 

outpatient 





Positive blood cultures (1 of 2) - possibly 2/2 contaminant (Moraxella Ovis)


- Remains hemodynamically stable and afebrile


- No leukocytosis


- Blood cultures 10/7: Moraxella Ovis


- Blood cultures 10/10: Pending 


- Hold off on antibiotics at this time 





Right lung opacity - possibly 2/2 malignancy, possibly 2/2 sarcoidosis


- Has had biopsy completed in the past that was consistent with Sarcoidosis


- Will likely require repeat imaging; however unable to obtain CT chest given 

his obesity 





HTN


- Blood well controlled 


- s/p Furosemide 


- s/p amlodipine





Super morbid obesity


- BMI of 82.8


- Complicating medical care


- c/w Nystatin powder for abdominal folds irritation


- Patient will likely require outpatient follow up with PCP for consideration of

bariatric surgery 





DVT prophylaxis 


- c/w Heparin





Disposition: 


- Working with PFS to establish outpatient insurance coverage; to ensure patient

can get oxygen/possible BiPAP


- Possible trial off BIPAP today





VS,Fishbone, I+O


VS, Fishbone, I+O


Laboratory Tests


10/11/20 04:38











Vital Signs








  Date Time  Temp Pulse Resp B/P (MAP) Pulse Ox O2 Delivery O2 Flow Rate FiO2


 


10/11/20 07:36        30


 


10/11/20 07:00  77  115/58 (77) 96 NIPPV (BIPAP/CPAP)  


 


10/11/20 06:01   20     


 


10/11/20 04:01 97.8       


 


10/10/20 18:00       2.0 














I&O- Last 24 Hours up to 6 AM 


 


 10/11/20





 06:00


 


Intake Total 810 ml


 


Output Total 800 ml


 


Balance 10 ml

















SHAUN CELESTIN MD                Oct 11, 2020 09:08

## 2020-10-11 NOTE — CCN
DATE:  10/09/2020



CRITICAL CARE TIME: One hour and 15 minutes. This excludes all procedures. 



SUBJECTIVE:  I was called early this morning and overnight. Mr. Adan is a 
55-year-old male that I was called multiple times overnight with progressive 
hypercarbic respiratory failure. Adjustments were made to bilateral noninvasive 
ventilation to increase his pressure, his inspiratory pressure up to 26, 
expiratory pressure remains at 14 cm of water pressure, FiO2 remains at 0.40 as 
he has had no issues with worsening hypoxia; however, pCO2 and mental status 
have been worsening. This morning after I placed a nasal trumpet, he is able to 
answer questions now. He is able to tell me who the president of the [place] 
[place] is. He is able to tell me the month and the year and knows where he is. 
Currently, he is on 26/14 pulling tidal volumes of 483 to 529 with a backup rate
of 12, FiO2 of 0.40. He clearly has myoclonic jerking. He denies any pain. He 
states he had no illness prior to his presentation. He did not feel sick, he 
just felt very tired. He did have a net diuresis of 2.6 L yesterday. His pCO2 
climbed with a stable pH; therefore, I have discontinued his Lasix as he is 
likely getting too alkalemic. He remains on heparin and Protonix for prophylaxis




OBJECTIVE:

VITAL SIGNS: Temperature is 98.0, pulse is 82, respiratory rate is 19, blood 
pressure is 171/75 although the cuff appears malaligned. Oxygen saturation is 
97% on 0.40.

GENRAL: The patient is on BiLevel noninvasive therapy. He is able to speak 
through his mask. He has a nasal trumpet in place. 

HEENT: Sclerae clear and anicteric. Pupils are small approximately 4 mm, but 
reactive to light. Mucous membranes moist. Tongue is midline. Mallampati 4 
airway. 

NECK: Large circumference with significant extra tissue. Jugular venous pressure
(JVP) is difficult to assess due to body habitus.

LYMPHATICS: No cervical, supraclavicular, or axillary adenopathy.

CARDIAC: Distant S1, S2 without audible murmur, rub, or gallop. JVP as mentioned
above. There is no peripheral edema. 

PULMONARY: Decreased breath sounds throughout without rales, rhonchi, or 
wheezes. No dullness to percussion. Fair chest expansion despite obesity. 

ABDOMEN: Morbidly obese. I cannot palpate any internal organs. No discernable 
hepatosplenomegaly. I cannot auscultate any bruits. There is no mass. There is 
no palpable hernia. 

EXTREMITIES: No cyanosis, clubbing, or edema. Skin is pale without rash, 
jaundice, or bruising. 



LABORATORY EVALUATION: Shows a pH of 7.23, pO2 of 123, pO2 of 107. White blood 
cell count is 7.8, hemoglobin 14.8 with a platelet count of 200,000. Sodium is 
137, potassium 4.3, chloride 91, bicarb 44, BUN 13, creatinine 0.83. TSH was 
tested normal at 2.2. 





IMAGING: Chest x-ray shows significant improvement. Continues to have a right 
perihilar density versus consolidation. 



ASSESSMENT/PLAN: 

1.  Acute hypercarbic respiratory failure with obesity hypoventilation as the 
most likely underlying cause. Will continue BiLevel noninvasive therapy. Patient
would accept intubation and tracheostomy if necessary. If he continues to 
decompensate, this may be necessary; however, with discontinuing the diuretic 
therapy and placing nasal trumpet, we may be able to have him continue on 
BiLevel noninvasive therapy. He will require close monitoring as he is at high 
risk for the need for intubation. 

2.  Hypertension. I will initiate Norvasc. Will continue to monitor for blood 
pressures greater than 170.

3.  History of sarcoid with new finding of right hilar density. ACE level is 
pending. Patient will require further follow-up of this abnormality if he is to 
survive this hospitalization. 

4.  Deep vein thrombosis (DVT) prophylaxis with heparin three times a day. High 
risk for thromboembolic disease. 

5.  Protonix 40 mg for gastrointestinal (GI) prophylaxis. At this point in time,
the patient is not able to eat being on noninvasive therapy continuously. 
Nutrition status will be addressed on a daily basis.



Overall, the patient has a poor prognosis given the severity of his respiratory 
failure from obesity hypoventilation. 



Critical care time as mentioned above. This excludes all procedures. 

MTDD

## 2020-10-11 NOTE — CCN
DATE:  10/11/2020



REASON FOR PULMONARY CONSULT: BiPAP management. 



SUBJECTIVE:  Mr. Adan is sitting at his bedside, just finished his breakfast. 
He has been doing well without increased shortness of breath. No fevers or 
cough. Denies any lower extremity edema or calf pain. Denies any chest 
discomfort. He states he wants to try ambulating today. 



Arterial blood gases fairly compensated this morning with a mild respiratory 
acidosis of 7.34, pCO2 of 79, and pO2 of 135. He is able to have discussions. We
discussed weight loss, different mechanisms for weight loss including dietary 
restrictions and possibly [employer]. He expresses understanding. 



OBJECTIVE: 

VITAL SIGNS: Temperature 97.8, pulse 77, blood pressure 115/58, respiratory rate
20. Oxygen saturation 96% on 0.03 FiO2 and BiLevel 26/14. 

GENERAL: Awake, alert, and oriented. No obvious tachypnea. 

HEENT: Has a bandage over the bridge of his nose from the pressure from the 
BiPAP. He also has a pressure kylie on the back of his head from the straps from 
the BiPAP. Tongue is midline. Mucous membranes are moist. Oropharynx without 
erythema or exudate. Mallampati 4. 

NECK: Large in circumference. No discernable jugular venous pressure (JVP). No 
thyromegaly or mass.

LYMPHATIC: No cervical, supraclavicular, or axillary adenopathy. 

CARDIAC: Regular S1, S2 without audible murmur, rub, or gallops. 

PULMONARY: Clear to auscultation without rales, rhonchi, or wheezes. No dullness
to percussion. No accessory muscle.

ABDOMEN: Obese, soft, nontender, and nondistended. No hepatosplenomegaly. No 
masses or herniation. 

EXTREMITIES: No cyanosis, clubbing, or edema. No calf pain. 

SKIN: No rash, jaundice, or bruising. 



LABORATORY EVALUATION: Shows a white blood cell count of 5.6, hemoglobin 13.8, 
platelet count of 198,000, sodium 138, potassium 3.6, chloride 95, bicarb 42, 
BUN 17, creatinine 0.82 with an arterial blood gas of 7.34, pCO2 of 79, pO2 of 
135.



One bottle of the blood cultures when he first arrived on admission grew 
Moraxella. 



IMPRESSION/PLAN:

1.  Acute on chronic hypercarbic respiratory failure better compensated. Will 
try off BiLevel while awake. He will require BiLevel noninvasive therapy for all
forms of sleep. I believe this is likely a CPAP failure and he will require 
BiLevel at home. May not require as much inspiratory pressure as initially 
required. Therefore, will require retitration study. Will try to arrange this as
soon as possible at the time of discharge when he is at his chronic stable 
state. 

2.  Moraxella positive blood culture x1. He has no fever. No chills. No white 
count. No evidence of infection. This may be a spurious result and at this point
in time after discussing with the primary attending, it does not appear he 
requires antibiotics. We will continually monitor him for the need for 
antibiotic therapy. 

3.  Hypertension. Does not require any antihypertensive as blood pressure is 
actually on the lower side. 

4.  Deep vein thrombosis (DVT) prophylaxis with heparin and gastrointestinal 
(GI) prophylaxis with Protonix. Patient will be monitored closely in the 
intensive care unit (ICU) for decompensation. If he does well, we will change 
him over to table top tomorrow. 

MTDD

## 2020-10-12 VITALS — DIASTOLIC BLOOD PRESSURE: 85 MMHG | SYSTOLIC BLOOD PRESSURE: 150 MMHG

## 2020-10-12 VITALS — DIASTOLIC BLOOD PRESSURE: 56 MMHG | SYSTOLIC BLOOD PRESSURE: 117 MMHG

## 2020-10-12 VITALS — DIASTOLIC BLOOD PRESSURE: 57 MMHG | SYSTOLIC BLOOD PRESSURE: 113 MMHG

## 2020-10-12 VITALS — DIASTOLIC BLOOD PRESSURE: 67 MMHG | SYSTOLIC BLOOD PRESSURE: 132 MMHG

## 2020-10-12 VITALS — DIASTOLIC BLOOD PRESSURE: 79 MMHG | SYSTOLIC BLOOD PRESSURE: 110 MMHG

## 2020-10-12 VITALS — DIASTOLIC BLOOD PRESSURE: 78 MMHG | SYSTOLIC BLOOD PRESSURE: 116 MMHG

## 2020-10-12 VITALS — DIASTOLIC BLOOD PRESSURE: 60 MMHG | SYSTOLIC BLOOD PRESSURE: 114 MMHG

## 2020-10-12 VITALS — SYSTOLIC BLOOD PRESSURE: 115 MMHG | DIASTOLIC BLOOD PRESSURE: 63 MMHG

## 2020-10-12 VITALS — DIASTOLIC BLOOD PRESSURE: 55 MMHG | SYSTOLIC BLOOD PRESSURE: 104 MMHG

## 2020-10-12 VITALS — SYSTOLIC BLOOD PRESSURE: 114 MMHG | DIASTOLIC BLOOD PRESSURE: 60 MMHG

## 2020-10-12 VITALS — DIASTOLIC BLOOD PRESSURE: 56 MMHG | SYSTOLIC BLOOD PRESSURE: 114 MMHG

## 2020-10-12 VITALS — DIASTOLIC BLOOD PRESSURE: 52 MMHG | SYSTOLIC BLOOD PRESSURE: 104 MMHG

## 2020-10-12 LAB
BASE EXCESS BLDA CALC-SCNC: 11.6 MMOL/L (ref -2–2)
BUN SERPL-MCNC: 13 MG/DL (ref 7–18)
CALCIUM SERPL-MCNC: 8.2 MG/DL (ref 8.5–10.1)
CHLORIDE SERPL-SCNC: 97 MEQ/L (ref 98–107)
CO2 BLDA CALC-SCNC: 43.5 MEQ/L (ref 22–29)
CO2 SERPL-SCNC: 40 MEQ/L (ref 21–32)
CREAT SERPL-MCNC: 0.73 MG/DL (ref 0.7–1.3)
GFR SERPL CREATININE-BSD FRML MDRD: > 60 ML/MIN/{1.73_M2} (ref 56–?)
GLUCOSE SERPL-MCNC: 96 MG/DL (ref 70–100)
HCO3 BLDA-SCNC: 41.1 MEQ/L (ref 22–26)
HCO3 STD BLDA-SCNC: 35.5 MEQ/L (ref 22–26)
HCT VFR BLD AUTO: 47.6 % (ref 42–52)
HGB BLD-MCNC: 13.8 G/DL (ref 13.5–17.5)
MAGNESIUM SERPL-MCNC: 2.1 MG/DL (ref 1.8–2.4)
MCH RBC QN AUTO: 28.4 PG (ref 27–33)
MCHC RBC AUTO-ENTMCNC: 29 G/DL (ref 32–36.5)
MCV RBC AUTO: 97.9 FL (ref 80–96)
PCO2 BLDA: 78.3 MMHG (ref 35–45)
PH BLDA: 7.34 UNITS (ref 7.35–7.45)
PLATELET # BLD AUTO: 190 10^3/UL (ref 150–450)
PO2 BLDA: 133.7 MMHG (ref 75–100)
POTASSIUM SERPL-SCNC: 4 MEQ/L (ref 3.5–5.1)
RBC # BLD AUTO: 4.86 10^6/UL (ref 4.3–6.1)
SAO2 % BLDA: 98.9 % (ref 95–99)
SODIUM SERPL-SCNC: 141 MEQ/L (ref 136–145)
WBC # BLD AUTO: 5.4 10^3/UL (ref 4–10)

## 2020-10-12 RX ADMIN — DEXTROSE MONOHYDRATE SCH MG: 50 INJECTION, SOLUTION INTRAVENOUS at 20:40

## 2020-10-12 RX ADMIN — SODIUM CHLORIDE SCH UNITS: 4.5 INJECTION, SOLUTION INTRAVENOUS at 05:56

## 2020-10-12 RX ADMIN — SODIUM CHLORIDE SCH UNITS: 4.5 INJECTION, SOLUTION INTRAVENOUS at 14:38

## 2020-10-12 RX ADMIN — SODIUM CHLORIDE SCH UNITS: 4.5 INJECTION, SOLUTION INTRAVENOUS at 22:39

## 2020-10-12 NOTE — IPNPDOC
Text Note


Date of Service


The patient was seen on 10/12/20.





NOTE


Subjective:


Patient is a 55-year-old  male with past medical history of congestive 

heart failure, sleep apnea on CPAP was presented to the emergency room with 

shortness of breath over the last 1 month duration. Patient reports that over 

the course of 1 month hes been experience and worsening shortness of breath., 

Shortness of breath was worse with exertion but now occurs at rest. Patient 

denies any chest pain at rest, however, does report chest pain with exertion. 

Patient describes the chest pain is throbbing in nature and resolves quickly. 

Patient has an associated dry cough without any expectoration. Has not 

experience any fevers or chills but does experience intermittent lower extremity

swelling.





Patient has reported that over the duration of 1 year he has lost insurance 

coverage. Since that point he was unable to maintain his home oxygen that he 

bleeds into his CPAP device, so he has been without oxygen and his CPAP for 

greater than 1 year. He is also failed to follow-up with any providers. Patient 

follows with Dr. Lino and Dr. Elizabeth, but has last seen 1 year ago.





Patient was seen and examined at the bedside. Patient reports that yesterday he 

was able to tolerate a day off of BiPAP and only used it with sleep at night. Papito toney denies any chest pain, palpitations or cough. Denies any nausea, vomiting,

abdominal pain, diarrhea, or urinary discomfort.





Objective:


Vitals (See below)


General: Lying in bed, does not appear to be in any distress, awake alert and 

oriented 3


HEENT: Normocephalic, atraumatic


CVS: +S1S2


Lungs: Fair air entry bilaterally, no wheezing, rhonchi or rales


Abdomen: Soft, nondistended, nontender, significant obesity


Extremities: Lower extremities are without any pitting edema, - Calf tenderness





Assessment and plan:


Acute hypoxic and hypercapnia respiratory failure - possibly 2/2 obesity 

hypoventilation syndrome and non-compliance


- On arrival to ER, patient was SOB, had blue lips - cyanotic; was placed on 

100% nonrebreather, then on BiPAP


- Currently patient appears to be significantly improved, was able to tolerate a

day off of BiPAP; was kept on BiPAP only at night 


- ABG noted - remains relatively stable 


- No leukocytosis 


- CXR 10/7: 1. Focal consolidation right perihilar region which may be due to 

pneumonia or an underlying mass. 2. Probable subsegmental atelectasis at the 

left lung base.


- ECHO 10/7: Features of diastolic dysfunction, mild tricuspid regurgitation 

with moderate pulmonary hypertension, dilated right heart chambers with some 

degree of right ventricular systolic dysfunction


- c/w Strict in/outs, daily weight, fluid restriction, telemetry 


- s/p Furosemide and Amlodipine 


- Pulmonology on consultation; appreciate their input 


- PFS on consult to help ensure insurance coverage / equipment coverage as out

patient 





Positive blood cultures (1 of 2) - possibly 2/2 contaminant (Moraxella Ovis)


- Remains hemodynamically stable and afebrile


- No leukocytosis


- Blood cultures 10/7: Moraxella Ovis


- Blood cultures 10/10: No growth at 24 hours 


- Hold off on antibiotics at this time 





Right lung opacity - possibly 2/2 malignancy, possibly 2/2 sarcoidosis


- Has had biopsy completed in the past that was consistent with Sarcoidosis


- Will likely require repeat imaging; however unable to obtain CT chest given 

his obesity 





s/p HTN


- Blood well controlled without medications


- s/p Furosemide and Amlodipine





Super morbid obesity


- BMI of 82.8


- Complicating medical care


- c/w Nystatin powder for abdominal folds irritation


- Patient will likely require outpatient follow up with PCP for consideration of

bariatric surgery 





DVT prophylaxis 


- c/w Heparin (adjusted for weight)





Disposition: 


- Working with PFS to establish outpatient insurance coverage; to ensure patient

can get oxygen/possible BiPAP


- Trial of table top BiPAP at night





VS,Fishbone, I+O


VS, Fishbone, I+O


Laboratory Tests


10/12/20 04:46











Vital Signs








  Date Time  Temp Pulse Resp B/P (MAP) Pulse Ox O2 Delivery O2 Flow Rate FiO2


 


10/12/20 08:00        30


 


10/12/20 08:00 97.0 87 28 117/56 (76) 93 Nasal Cannula 2.0 














I&O- Last 24 Hours up to 6 AM 


 


 10/12/20





 06:00


 


Intake Total 1140 ml


 


Output Total 600 ml


 


Balance 540 ml

















SHAUN CELESTIN MD                Oct 12, 2020 09:54

## 2020-10-12 NOTE — REP
PORTABLE CHEST X-RAY: 2-VIEWS 



HISTORY: Pulmonary edema.



COMPARISON STUDY: 10/07/2020 and 03/15/2018. 



FINDINGS: 

There is a large irregular density in the right parahilar region suggesting 
mass, versus potentially fissural fluid (aka pseudotumor). This is essentially 
unchanged from the previous day radiographs. There is extensive interstitial 
fibrosis pattern throughout the perihilar regions bilaterally. There is chronic 
fullness in the mediastinum and AP window region, question adenopathy. No new 
infiltrate when compared with the 10/07/2020 study.   



IMPRESSION: 

Advanced diffuse interstitial fibrosis. Large mass-like opacity right perihilar 
region. Neoplastic mass versus fissural fluid. No change from the previous day 
study.   

MTDD

## 2020-10-13 VITALS — DIASTOLIC BLOOD PRESSURE: 58 MMHG | SYSTOLIC BLOOD PRESSURE: 107 MMHG

## 2020-10-13 VITALS — SYSTOLIC BLOOD PRESSURE: 126 MMHG | DIASTOLIC BLOOD PRESSURE: 68 MMHG

## 2020-10-13 VITALS — DIASTOLIC BLOOD PRESSURE: 57 MMHG | SYSTOLIC BLOOD PRESSURE: 108 MMHG

## 2020-10-13 VITALS — DIASTOLIC BLOOD PRESSURE: 81 MMHG | SYSTOLIC BLOOD PRESSURE: 143 MMHG

## 2020-10-13 VITALS — DIASTOLIC BLOOD PRESSURE: 59 MMHG | SYSTOLIC BLOOD PRESSURE: 102 MMHG

## 2020-10-13 VITALS — DIASTOLIC BLOOD PRESSURE: 62 MMHG | SYSTOLIC BLOOD PRESSURE: 120 MMHG

## 2020-10-13 LAB
BASE EXCESS BLDA CALC-SCNC: 12.3 MMOL/L (ref -2–2)
BUN SERPL-MCNC: 11 MG/DL (ref 7–18)
CALCIUM SERPL-MCNC: 8.2 MG/DL (ref 8.5–10.1)
CHLORIDE SERPL-SCNC: 98 MEQ/L (ref 98–107)
CO2 BLDA CALC-SCNC: 41.9 MEQ/L (ref 22–29)
CO2 SERPL-SCNC: 39 MEQ/L (ref 21–32)
CREAT SERPL-MCNC: 0.72 MG/DL (ref 0.7–1.3)
GFR SERPL CREATININE-BSD FRML MDRD: > 60 ML/MIN/{1.73_M2} (ref 56–?)
GLUCOSE SERPL-MCNC: 86 MG/DL (ref 70–100)
HCO3 BLDA-SCNC: 39.9 MEQ/L (ref 22–26)
HCO3 STD BLDA-SCNC: 36.1 MEQ/L (ref 22–26)
HCT VFR BLD AUTO: 46.3 % (ref 42–52)
HGB BLD-MCNC: 13.5 G/DL (ref 13.5–17.5)
MAGNESIUM SERPL-MCNC: 2.1 MG/DL (ref 1.8–2.4)
MCH RBC QN AUTO: 27.8 PG (ref 27–33)
MCHC RBC AUTO-ENTMCNC: 29.2 G/DL (ref 32–36.5)
MCV RBC AUTO: 95.5 FL (ref 80–96)
PCO2 BLDA: 64.1 MMHG (ref 35–45)
PH BLDA: 7.41 UNITS (ref 7.35–7.45)
PLATELET # BLD AUTO: 179 10^3/UL (ref 150–450)
PO2 BLDA: 76.7 MMHG (ref 75–100)
POTASSIUM SERPL-SCNC: 4.4 MEQ/L (ref 3.5–5.1)
RBC # BLD AUTO: 4.85 10^6/UL (ref 4.3–6.1)
SAO2 % BLDA: 95.5 % (ref 95–99)
SODIUM SERPL-SCNC: 141 MEQ/L (ref 136–145)
WBC # BLD AUTO: 5.1 10^3/UL (ref 4–10)

## 2020-10-13 RX ADMIN — DEXTROSE MONOHYDRATE SCH MG: 50 INJECTION, SOLUTION INTRAVENOUS at 22:12

## 2020-10-13 RX ADMIN — SODIUM CHLORIDE SCH UNITS: 4.5 INJECTION, SOLUTION INTRAVENOUS at 06:36

## 2020-10-13 RX ADMIN — SODIUM CHLORIDE SCH UNITS: 4.5 INJECTION, SOLUTION INTRAVENOUS at 22:12

## 2020-10-13 RX ADMIN — SODIUM CHLORIDE SCH UNITS: 4.5 INJECTION, SOLUTION INTRAVENOUS at 14:44

## 2020-10-13 NOTE — REP
PORTABLE CHEST X-RAY: 2-VIEWS 



HISTORY: Hypercarbic respiratory failure. 



COMPARISON: Chest x-ray 10/08/2020 and 10/07/2020. 



FINDINGS: 

A large mass-like opacity persists in the right parahilar region. There is 
advanced diffuse interstitial lung disease consistent with fibrosis. Mild 
cardiomegaly is observed. There is fullness in the mediastinum unchanged. No new
acute infiltrate. No evidence of pleural effusion.  

MTDD

## 2020-10-13 NOTE — IPN
DATE:  10/13/2020



SUBJECTIVE:  Mr. Adan is seen in the ICU. He is on BiPAP 24/14 with a 4 liter 
oxygen bleed in at nighttime. During the day he has been tolerating just being 
on nasal cannula with 4 liters. He has been afebrile and has had no new issues. 
Patient does feel that he is gradually improving. He has been trying different 
masks and feeling more comfortable with the BiPAP. 



PHYSICAL EXAMINATION:  Vitals: Temperature 98.9, pulse 78, respiratory rate 22, 
blood pressure 102/59, pulse ox 91% on BiPAP 24/14 with 4 liter oxygen bleed in.
patient is currently on oxygen 4 liters by nasal cannula. 

General: Patient is alert and oriented. He is sitting up in a chair. He speaks 
in complete sentences. 

HEENT: Head is normocephalic, atraumatic. Moist mucous membranes. Tongue is 
middling.

Neck: Supple, trachea is midline. No cervical lymphadenopathy. No obvious JVD. 

Heart: Regular rate and rhythm, S1 and S2, without murmurs, rubs or gallops. 

Pulmonary: Diminished breath sounds throughout with rales, rhonchi, wheezes or 
crackles. No accessory muscle use.

Abdomen: Obese, positive bowel sounds, soft, nontender. No obvious 
hepatosplenomegaly; however, this is difficult to elicit due to body habitus. 

Extremities: No cyanosis, clubbing or edema. 

Neurologic: Nonfocal.



LABORATORY DATA: pH 7.412, PCO2 64.1, PO2 76.7. WBC 5.1, hemoglobin 13.5, 
hematocrit 46.3, platelets 179. Sodium 141, potassium 4.4, chloride 98, carbon 
dioxide 39, BUN 11, creatinine 0.72, glucose 86, calcium 8.2, magnesium 2.1. 



ASSESSMENT AND PLAN:  Hypercarbic respiratory failure secondary to obesity 
hypoventilation: Patient failed CPAP at home. He has been doing well with BiPAP.
He requires continued BiPAP. We are working on obtaining BiPAP for the patient 
in order to get him home on BiPAP. He is tolerating the new mask. Continue to 
monitor.

MTDD

## 2020-10-13 NOTE — IPNPDOC
Text Note


Date of Service


The patient was seen on 10/13/20.





NOTE


Subjective:


Patient is a 55-year-old  male with past medical history of congestive 

heart failure, sleep apnea on CPAP was presented to the emergency room with 

shortness of breath over the last 1 month duration. Patient reports that over 

the course of 1 month hes been experience and worsening shortness of breath., 

Shortness of breath was worse with exertion but now occurs at rest. Patient 

denies any chest pain at rest, however, does report chest pain with exertion. 

Patient describes the chest pain is throbbing in nature and resolves quickly. 

Patient has an associated dry cough without any expectoration. Has not 

experience any fevers or chills but does experience intermittent lower extremity

swelling.





Patient has reported that over the duration of 1 year he has lost insurance 

coverage. Since that point he was unable to maintain his home oxygen that he 

bleeds into his CPAP device, so he has been without oxygen and his CPAP for 

greater than 1 year. He is also failed to follow-up with any providers. Patient 

follows with Dr. Lino and Dr. Elizabeth, but has last seen 1 year ago.





Patient was seen and examined at the bedside. Partially sitting up in bed, 

denied any problems overnight. Denied any chest pain, shortness breath or 

palpitations. Patient has not experience any nausea, vomiting, pain, diarrhea, 

or urinary discomfort.





Objective:


Vitals (See below)


General: Partially sitting up in bed, comfortable, no acute distress, awake, 

alert, oriented 3


HEENT: Normocephalic, atraumatic


CVS: +S1S2


Lungs: Air entry is fair bilaterally without any auscultated rhonchi, crackles 

or wheezing


Abdomen: Abdomen remains soft, morbidly obese, nondistended and nontender


Extremities: Trace edema appreciated at lower extremities, - Calf tenderness





Assessment and plan:


Acute hypoxic and hypercapnia respiratory failure - possibly 2/2 obesity 

hypoventilation syndrome and non-compliance


- On arrival to ER, patient was SOB, had blue lips - cyanotic; was placed on 

100% nonrebreather, then on BiPAP


- Patient was tolerated table top BiPAP throughout the night; will likely 

downgrade patient from ICU


- ABG this morning shows continued improvement of PCO2


- No leukocytosis 


- CXR 10/7: 1. Focal consolidation right perihilar region which may be due to 

pneumonia or an underlying mass. 2. Probable subsegmental atelectasis at the 

left lung base.


- ECHO 10/7: Features of diastolic dysfunction, mild tricuspid regurgitation 

with moderate pulmonary hypertension, dilated right heart chambers with some 

degree of right ventricular systolic dysfunction


- c/w Strict in/outs, daily weight, fluid restriction, telemetry 


- s/p Furosemide and Amlodipine 


- Pulmonology on consultation; appreciate their input 


- PFS on consult to help ensure insurance coverage / equipment coverage as 

outpatient - the patient will require BiPAP on discharge





Positive blood cultures (1 of 2) - possibly 2/2 contaminant (Moraxella Ovis)


- Remains hemodynamically stable and afebrile


- No leukocytosis


- Blood cultures 10/7: Moraxella Ovis


- Blood cultures 10/10: No growth at 48 hours 


- Hold off on antibiotics at this time 





Right lung opacity - possibly 2/2 malignancy, possibly 2/2 sarcoidosis


- Has had biopsy completed in the past that was consistent with Sarcoidosis


- May require repeat imaging; however unable to obtain CT chest given his 

obesity 





s/p HTN


- Blood well controlled without medications


- s/p Furosemide and Amlodipine





Super morbid obesity


- BMI of 82.8


- Complicating medical care


- c/w Nystatin powder for abdominal folds irritation


- Patient will likely require outpatient follow up with PCP for consideration of

bariatric surgery 





DVT prophylaxis 


- c/w Heparin 





Disposition: 


- PFS / Case management are working to establish outpatient insurance coverage; 

coverage of BiPAP


- Downgrade today





VS,Fishbone, I+O


VS, Fishbone, I+O


Laboratory Tests


10/13/20 03:40











Vital Signs








  Date Time  Temp Pulse Resp B/P (MAP) Pulse Ox O2 Delivery O2 Flow Rate FiO2


 


10/13/20 08:00       2.0 


 


10/13/20 07:42 98.0 80 24 120/62 (81) 91 Nasal Cannula  


 


10/12/20 12:00        30














I&O- Last 24 Hours up to 6 AM 


 


 10/13/20





 06:00


 


Intake Total 1410 ml


 


Balance 1410 ml

















SHAUN CELESTIN MD                Oct 13, 2020 08:21

## 2020-10-13 NOTE — CCN
PULMONARY NOTE



DATE: 10/12/2020



HISTORY OF PRESENT ILLNESS:  Ashok feels good today. No chest discomfort. He has
been sitting at the side of the bed. He was able to eat breakfast. Denies any 
increased shortness of breath, cough, fever, or chills. He is having some 
pressure ulceration on the back of his head from the straps. Because he has 
lightened the straps, he has noticed some more air leak from the mask. I will 
attempt to get him a new mask and new head gear today to see if this alleviates 
his symptoms. 



PHYSICAL EXAMINATION: 

VITAL SIGNS: Pulse is 87, blood pressure is 117/56, oxygen saturation is 93% on 
2 L.  Overnight he remained on BiLevel noninvasive therapy 26/14. Temperature is
97.0. He has been afebrile over the past two days. 

GENERAL: Awake, alert, and oriented. Affect and mood are appropriate. Nutrition 
and hygiene are good. 

HEENT: Sclerae clear and anicteric. Pupils equal and reactive to light. Mucous 
membranes moist without lesions. Oropharynx is crowded without erythema or 
exudate. Tongue is midline.

NECK: Supple. No tracheal deviation. No mass or lesion. 

LYMPHATICS: No cervical, supraclavicular, or axillary adenopathy. 

CARDIAC: Regular S1, S2 without audible murmur, rub, or gallop. No elevated 
jugular venous pressure (JVP) as it is not discernable given body habitus. PMI 
is also difficult to palpate. 

PULMONARY: Decreased breath sounds throughout without rales, rhonchi, or 
wheezes. No dullness to percussion. Some decrease in chest expansion. 

ABDOMEN: Obese, soft, and nontender with hyperactive bowel sounds. No 
discernable hepatosplenomegaly or mass. There is dependent edema of the pannus. 

EXTREMITIES: No cyanosis, clubbing, or edema. 

MUSCULOSKELETAL: Moves all extremities, but is impaired by his obesity. No 
evidence of joint effusion. 

NEUROLOGIC: No asterixis or tremor.



LABORATORY EVALUATION: Shows sodium 141, potassium 4.0, chloride 97, bicarb 40, 
BUN 13, creatinine 0.73 with a white blood cell count of 5.4, hemoglobin 13.8, 
hematocrit of 47.6 with a platelet count of 190,000.



Blood cultures are negative x2 so far, which were repeated due to one blood 
culture being positive for Moraxella thought to be contaminant. 



IMPRESSION AND PLAN: 

Hypercarbic respiratory failure secondary to obesity hypoventilation. Failed 
outpatient CPAP. Therefore, requires BiLevel at home. Will attempt to obtain 
this at home for him. His ask will be changed today to hopefully avoid pressure 
over the same area. Will continue to monitor and adjust mask fitting as 
tolerated. We will change him to a table top today. 

MTDD

## 2020-10-14 VITALS — DIASTOLIC BLOOD PRESSURE: 62 MMHG | SYSTOLIC BLOOD PRESSURE: 112 MMHG

## 2020-10-14 VITALS — SYSTOLIC BLOOD PRESSURE: 117 MMHG | DIASTOLIC BLOOD PRESSURE: 64 MMHG

## 2020-10-14 VITALS — SYSTOLIC BLOOD PRESSURE: 110 MMHG | DIASTOLIC BLOOD PRESSURE: 69 MMHG

## 2020-10-14 LAB
BASE EXCESS BLDA CALC-SCNC: 8.6 MMOL/L (ref -2–2)
BUN SERPL-MCNC: 10 MG/DL (ref 7–18)
CALCIUM SERPL-MCNC: 8.7 MG/DL (ref 8.5–10.1)
CHLORIDE SERPL-SCNC: 100 MEQ/L (ref 98–107)
CO2 BLDA CALC-SCNC: 37.2 MEQ/L (ref 22–29)
CO2 SERPL-SCNC: 34 MEQ/L (ref 21–32)
CREAT SERPL-MCNC: 0.78 MG/DL (ref 0.7–1.3)
GFR SERPL CREATININE-BSD FRML MDRD: > 60 ML/MIN/{1.73_M2} (ref 56–?)
GLUCOSE SERPL-MCNC: 138 MG/DL (ref 70–100)
HCO3 BLDA-SCNC: 35.4 MEQ/L (ref 22–26)
HCO3 STD BLDA-SCNC: 32.3 MEQ/L (ref 22–26)
HCT VFR BLD AUTO: 48.6 % (ref 42–52)
HGB BLD-MCNC: 14.3 G/DL (ref 13.5–17.5)
MAGNESIUM SERPL-MCNC: 2.1 MG/DL (ref 1.8–2.4)
MCH RBC QN AUTO: 27.6 PG (ref 27–33)
MCHC RBC AUTO-ENTMCNC: 29.4 G/DL (ref 32–36.5)
MCV RBC AUTO: 93.8 FL (ref 80–96)
PCO2 BLDA: 57.7 MMHG (ref 35–45)
PH BLDA: 7.41 UNITS (ref 7.35–7.45)
PLATELET # BLD AUTO: 201 10^3/UL (ref 150–450)
PO2 BLDA: 65.2 MMHG (ref 75–100)
POTASSIUM SERPL-SCNC: 4.2 MEQ/L (ref 3.5–5.1)
RBC # BLD AUTO: 5.18 10^6/UL (ref 4.3–6.1)
SAO2 % BLDA: 92.8 % (ref 95–99)
SODIUM SERPL-SCNC: 140 MEQ/L (ref 136–145)
WBC # BLD AUTO: 4.7 10^3/UL (ref 4–10)

## 2020-10-14 RX ADMIN — SODIUM CHLORIDE SCH UNITS: 4.5 INJECTION, SOLUTION INTRAVENOUS at 06:39

## 2020-10-14 RX ADMIN — DEXTROSE MONOHYDRATE SCH MG: 50 INJECTION, SOLUTION INTRAVENOUS at 21:46

## 2020-10-14 RX ADMIN — SODIUM CHLORIDE SCH UNITS: 4.5 INJECTION, SOLUTION INTRAVENOUS at 21:46

## 2020-10-14 RX ADMIN — SODIUM CHLORIDE SCH UNITS: 4.5 INJECTION, SOLUTION INTRAVENOUS at 14:37

## 2020-10-14 NOTE — DS.PDOC
Discharge Summary


General


Date of Admission


Oct 7, 2020 at 17:16


Date of Discharge


ADDENDUM TO DATE: 10/15/2020


- Patient remain in the hospital for an additional day in order to ensure oxygen

setup at home





Discharge Summary


PROCEDURES PERFORMED DURING STAY: 


[None]





ADMITTING DIAGNOSES / DISCHARGE DIAGNOSES:


Acute hypoxic and hypercapnia respiratory failure - possibly 2/2 obesity 

hypoventilation syndrome and non-compliance


Positive blood cultures (1 of 2) - possibly 2/2 contaminant (Moraxella Ovis)


Right lung opacity - possibly 2/2 malignancy, possibly 2/2 sarcoidosis


s/p HTN


Super morbid obesity


DVT prophylaxis 





COMPLICATIONS/CHIEF COMPLAINT: 


Shortness of breath 





HISTORY OF PRESENT ILLNESS:


Patient is a 55-year-old  male with past medical history of congestive 

heart failure, sleep apnea on CPAP was presented to the emergency room with 

shortness of breath over the last 1 month duration. Patient reports that over 

the course of 1 month hes been experience and worsening shortness of breath., 

Shortness of breath was worse with exertion but now occurs at rest. Patient 

denies any chest pain at rest, however, does report chest pain with exertion. 

Patient describes the chest pain is throbbing in nature and resolves quickly. 

Patient has an associated dry cough without any expectoration. Has not 

experience any fevers or chills but does experience intermittent lower extremity

 swelling.





Patient has reported that over the duration of 1 year he has lost insurance 

coverage. Since that point he was unable to maintain his home oxygen that he 

bleeds into his CPAP device, so he has been without oxygen and his CPAP for 

greater than 1 year. He is also failed to follow-up with any providers. Patient 

follows with Dr. Lino and Dr. Elizabeth, but has last seen 1 year ago.





HOSPITAL COURSE:


Acute hypoxic and hypercapnia respiratory failure - possibly 2/2 obesity 

hypoventilation syndrome and non-compliance


- On arrival to ER, patient was SOB, had blue lips - cyanotic; was placed on 1

00% nonrebreather, then on BiPAP


- Patient has done well with tabletop BiPAP overnight 


- ABG continues to reveal improvement, PCO2


- No leukocytosis 


- CXR 10/7: 1. Focal consolidation right perihilar region which may be due to 

pneumonia or an underlying mass. 2. Probable subsegmental atelectasis at the 

left lung base.


- ECHO 10/7: Features of diastolic dysfunction, mild tricuspid regurgitation w

ith moderate pulmonary hypertension, dilated right heart chambers with some 

degree of right ventricular systolic dysfunction


- c/w Strict in/outs, daily weight, fluid restriction; s/p telemetry 


- s/p Furosemide and Amlodipine 


- Pulmonology on consultation; appreciate their input 


- Patient will be discharged with BiPAP and oxygen as an outpatient


- Will have outpatient follow-up with pulmonology, Dr. Lino within the next 

14 days





Positive blood cultures (1 of 2) - possibly 2/2 contaminant (Moraxella Ovis)


- Remains hemodynamically stable and afebrile


- No leukocytosis


- Blood cultures 10/7: Moraxella Ovis


- Blood cultures 10/10: No growth at 48 hours 


- Hold off on antibiotics at this time 





Right lung opacity - possibly 2/2 malignancy, possibly 2/2 sarcoidosis


- Has had biopsy completed in the past that was consistent with Sarcoidosis


- May require repeat imaging; however unable to obtain CT chest given his 

obesity 


- Will have outpatient follow-up with pulmonology, Dr. Lino within the next 

14 days





s/p HTN


- Blood pressure is well controlled


- s/p Furosemide and Amlodipine





Super morbid obesity


- BMI of 82.8


- Complicating medical care


- c/w Nystatin powder for abdominal folds irritation


- Patient will likely require outpatient follow up with PCP for consideration of

 bariatric surgery 





DVT prophylaxis 


- c/w Heparin 





DISCHARGE MEDICATIONS: 


Please see below.


 


ALLERGIES: 


Please see below.





PHYSICAL EXAMINATION ON DISCHARGE:


Vitals (See below)


General: Patient sitting at the edge of the bed, appears to be comfortable, in 

no acute distress, awake, alert, oriented 3


HEENT: Normocephalic, atraumatic


CVS: +S1S2


Lungs: Air entry appears to be fair bilaterally without evidence of rhonchi, 

crackles or wheezing


Abdomen: Soft, ND, NT, Obese


Extremities: LE are without edema, - Calf tenderness





LABORATORY DATA: 


Please see below.





ACTIVITY: 


[As tolerated].





DISCHARGE PLAN:


Follow-up with primary care provider within 7 days and pulmonology within 2 

weeks


Remain compliant with treatment plan and medications


Return to the ER if you experience any problems





DISPOSITION:


Home with services





DISCHARGE CONDITION: 


[Stable].





TIME SPENT ON DISCHARGE: 


35 minutes.





Vital Signs/I&Os





Vital Signs








  Date Time  Temp Pulse Resp B/P (MAP) Pulse Ox O2 Delivery O2 Flow Rate FiO2


 


10/14/20 06:00 98.6 82 20 110/69 (83) 91 Nasal Cannula 2.0 


 


10/12/20 12:00        30














I&O- Last 24 Hours up to 6 AM 


 


 10/14/20





 06:00


 


Intake Total 630 ml


 


Balance 630 ml











Laboratory Data


Labs 24H


Laboratory Tests 2


10/14/20 05:30: 


Blood Gas Bicarbonate Standard 32.3H, Arterial Blood pH 7.406, Arterial Blood 

Partial Pressure CO2 57.7H, Arterial Blood Partial Pressure O2 65.2L, Arterial 

Blood Total CO2 37.2H, Arterial Blood HCO3 35.4H, Arterial Blood Base Excess 

8.6H, Arterial Blood Oxygen Saturation 92.8L


10/14/20 07:28: 


Nucleated Red Blood Cells % (auto) 0.0, Anion Gap 6L, Glomerular Filtration Rate

 > 60.0, Calcium Level 8.7, Magnesium Level 2.1


CBC/BMP


Laboratory Tests


10/14/20 07:28











Microbiology





Microbiology


10/10/20 Blood Culture - Preliminary, Resulted


           No Growth after 72 hours. All specime...


10/10/20 Blood Culture - Preliminary, Resulted


           No Growth after 72 hours. All specime...


10/7/20 Respiratory Virus Panel (PCR) ([facility]) - Final, Complete


          


10/7/20 Blood Culture - Final, Complete


          Moraxella (Neisseria) Ovis


10/7/20 Blood Culture - Final, Complete


          NO GROWTH AFTER 5 DAYS





Discharge Medications


Scheduled PRN


Nystatin (Nystop) 60 Gm Powder, 0 DOSE TOP BIDP PRN for RASH





Allergies


Coded Allergies:  


     No Known Allergies (Unverified , 10/7/20)











SHAUN CELESTIN MD                Oct 14, 2020 09:56

## 2020-10-15 VITALS — DIASTOLIC BLOOD PRESSURE: 75 MMHG | SYSTOLIC BLOOD PRESSURE: 114 MMHG

## 2020-10-15 LAB
BASE EXCESS BLDA CALC-SCNC: 2.9 MMOL/L (ref -2–2)
CO2 BLDA CALC-SCNC: 32.6 MEQ/L (ref 22–29)
HCO3 BLDA-SCNC: 30.8 MEQ/L (ref 22–26)
HCO3 STD BLDA-SCNC: 27 MEQ/L (ref 22–26)
PCO2 BLDA: 60.8 MMHG (ref 35–45)
PH BLDA: 7.32 UNITS (ref 7.35–7.45)
PO2 BLDA: 82.7 MMHG (ref 75–100)
SAO2 % BLDA: 96.1 % (ref 95–99)

## 2020-10-15 RX ADMIN — SODIUM CHLORIDE SCH UNITS: 4.5 INJECTION, SOLUTION INTRAVENOUS at 06:20

## 2020-11-30 ENCOUNTER — HOSPITAL ENCOUNTER (OUTPATIENT)
Dept: HOSPITAL 53 - M PLALAB | Age: 55
End: 2020-11-30
Attending: INTERNAL MEDICINE
Payer: COMMERCIAL

## 2020-11-30 DIAGNOSIS — J96.22: Primary | ICD-10-CM

## 2020-11-30 DIAGNOSIS — J84.10: ICD-10-CM

## 2020-11-30 NOTE — REPPI
INDICATION:

ACUTE AND CHRONIC RESPIRATORY FALURE W/HYPERCAPNIA



COMPARISON:

10/09/2020, 01/30/2018



TECHNIQUE:

PA and lateral.



FINDINGS:

Mediastinum and cardiac silhouette are stable.  Diffuse chronic fibrosis and scattered

reticulonodular interstitial disease is again noted and similar to prior examinations.

No discrete new focal large consolidation is appreciated.  No effusion.  No

pneumothorax.  Skeletal structures are intact.



IMPRESSION:

Diffuse chronic fibrosis and reticulonodular interstitial disease similar to multiple

prior examinations.





<Electronically signed by Rojelio Wellington > 11/30/20 [id number]

## 2022-09-16 ENCOUNTER — HOSPITAL ENCOUNTER (EMERGENCY)
Dept: HOSPITAL 53 - M ED | Age: 57
LOS: 1 days | Discharge: HOME | End: 2022-09-17
Payer: COMMERCIAL

## 2022-09-16 VITALS — HEIGHT: 70 IN | WEIGHT: 315 LBS | BODY MASS INDEX: 45.1 KG/M2

## 2022-09-16 DIAGNOSIS — G47.30: ICD-10-CM

## 2022-09-16 DIAGNOSIS — I50.9: ICD-10-CM

## 2022-09-16 DIAGNOSIS — Z79.899: ICD-10-CM

## 2022-09-16 DIAGNOSIS — M54.50: Primary | ICD-10-CM

## 2022-09-16 DIAGNOSIS — E66.9: ICD-10-CM

## 2022-09-16 PROCEDURE — 96374 THER/PROPH/DIAG INJ IV PUSH: CPT

## 2022-09-16 PROCEDURE — 80053 COMPREHEN METABOLIC PANEL: CPT

## 2022-09-16 PROCEDURE — 85027 COMPLETE CBC AUTOMATED: CPT

## 2022-09-16 PROCEDURE — 99284 EMERGENCY DEPT VISIT MOD MDM: CPT

## 2022-09-17 VITALS — SYSTOLIC BLOOD PRESSURE: 108 MMHG | DIASTOLIC BLOOD PRESSURE: 60 MMHG

## 2022-09-17 LAB
ALBUMIN SERPL BCG-MCNC: 3.6 GM/DL (ref 3.2–5.2)
ALT SERPL W P-5'-P-CCNC: 26 U/L (ref 12–78)
BILIRUB SERPL-MCNC: 0.4 MG/DL (ref 0.2–1)
BUN SERPL-MCNC: 19 MG/DL (ref 7–18)
CALCIUM SERPL-MCNC: 9.1 MG/DL (ref 8.5–10.1)
CHLORIDE SERPL-SCNC: 105 MEQ/L (ref 98–107)
CO2 SERPL-SCNC: 28 MEQ/L (ref 21–32)
CREAT SERPL-MCNC: 0.94 MG/DL (ref 0.7–1.3)
GFR SERPL CREATININE-BSD FRML MDRD: > 60 ML/MIN/{1.73_M2} (ref 56–?)
GLUCOSE SERPL-MCNC: 106 MG/DL (ref 70–100)
HCT VFR BLD AUTO: 42.4 % (ref 42–52)
HGB BLD-MCNC: 13.8 G/DL (ref 13.5–17.5)
MCH RBC QN AUTO: 30.2 PG (ref 27–33)
MCHC RBC AUTO-ENTMCNC: 32.5 G/DL (ref 32–36.5)
MCV RBC AUTO: 92.8 FL (ref 80–96)
PLATELET # BLD AUTO: 245 10^3/UL (ref 150–450)
POTASSIUM SERPL-SCNC: 4.3 MEQ/L (ref 3.5–5.1)
PROT SERPL-MCNC: 8 GM/DL (ref 6.4–8.2)
RBC # BLD AUTO: 4.57 10^6/UL (ref 4.3–6.1)
SODIUM SERPL-SCNC: 139 MEQ/L (ref 136–145)
WBC # BLD AUTO: 9.4 10^3/UL (ref 4–10)

## 2025-03-25 ENCOUNTER — HOSPITAL ENCOUNTER (OUTPATIENT)
Dept: HOSPITAL 53 - M PLALAB | Age: 60
End: 2025-03-25
Attending: PHYSICIAN ASSISTANT
Payer: COMMERCIAL

## 2025-03-25 DIAGNOSIS — I50.32: Primary | ICD-10-CM

## 2025-03-25 DIAGNOSIS — Z13.1: ICD-10-CM

## 2025-03-25 DIAGNOSIS — E66.01: ICD-10-CM

## 2025-03-25 DIAGNOSIS — Z13.220: ICD-10-CM

## 2025-03-25 LAB
ALBUMIN SERPL BCG-MCNC: 3.5 G/DL (ref 3.2–5.2)
ALP SERPL-CCNC: 128 U/L (ref 40–129)
ALT SERPL W P-5'-P-CCNC: 79 U/L (ref 7–40)
AST SERPL-CCNC: 58 U/L (ref ?–34)
BILIRUB SERPL-MCNC: 0.6 MG/DL (ref 0.3–1.2)
BUN SERPL-MCNC: 14 MG/DL (ref 9–23)
CALCIUM SERPL-MCNC: 9.4 MG/DL (ref 8.5–10.1)
CHLORIDE SERPL-SCNC: 98 MMOL/L (ref 98–107)
CHOLEST SERPL-MCNC: 188 MG/DL (ref ?–200)
CHOLEST/HDLC SERPL: 5.17 {RATIO} (ref ?–5)
CO2 SERPL-SCNC: 24 MMOL/L (ref 20–31)
CREAT SERPL-MCNC: 0.8 MG/DL (ref 0.7–1.3)
GFR SERPL CREATININE-BSD FRML MDRD: > 60 ML/MIN/{1.73_M2} (ref 56–?)
GLUCOSE SERPL-MCNC: 382 MG/DL (ref 60–100)
HCT VFR BLD AUTO: 49.6 % (ref 42–52)
HDLC SERPL-MCNC: 36.3 MG/DL (ref 40–?)
HGB BLD-MCNC: 16.3 G/DL (ref 13.5–17.5)
LDLC SERPL CALC-MCNC: 110.5 MG/DL (ref ?–100)
MCH RBC QN AUTO: 30 PG (ref 27–33)
MCHC RBC AUTO-ENTMCNC: 32.9 G/DL (ref 32–36.5)
MCV RBC AUTO: 91.3 FL (ref 80–96)
NONHDLC SERPL-MCNC: 151.7 MG/DL
PLATELET # BLD AUTO: 263 10^3/UL (ref 150–450)
POTASSIUM SERPL-SCNC: 4.4 MMOL/L (ref 3.5–5.1)
PROT SERPL-MCNC: 8.1 G/DL (ref 5.7–8.2)
RBC # BLD AUTO: 5.43 10^6/UL (ref 4.3–6.1)
SODIUM SERPL-SCNC: 134 MMOL/L (ref 136–145)
TRIGL SERPL-MCNC: 206 MG/DL (ref ?–150)
WBC # BLD AUTO: 8.4 10^3/UL (ref 4–10)

## 2025-07-30 ENCOUNTER — HOSPITAL ENCOUNTER (OUTPATIENT)
Dept: HOSPITAL 53 - M PLALAB | Age: 60
End: 2025-07-30
Attending: PHYSICIAN ASSISTANT
Payer: COMMERCIAL

## 2025-07-30 DIAGNOSIS — E78.2: ICD-10-CM

## 2025-07-30 DIAGNOSIS — I50.32: Primary | ICD-10-CM

## 2025-07-30 LAB
ALBUMIN SERPL BCG-MCNC: 3.8 G/DL (ref 3.2–5.2)
ALP SERPL-CCNC: 85 U/L (ref 40–129)
ALT SERPL W P-5'-P-CCNC: 52 U/L (ref 7–40)
AST SERPL-CCNC: 55 U/L (ref ?–34)
BILIRUB SERPL-MCNC: 0.6 MG/DL (ref 0.3–1.2)
BUN SERPL-MCNC: 18 MG/DL (ref 9–23)
CALCIUM SERPL-MCNC: 9 MG/DL (ref 8.5–10.1)
CHLORIDE SERPL-SCNC: 100 MMOL/L (ref 98–107)
CHOLEST SERPL-MCNC: 107 MG/DL (ref ?–200)
CHOLEST/HDLC SERPL: 2.89 {RATIO} (ref ?–5)
CO2 SERPL-SCNC: 26 MMOL/L (ref 20–31)
CREAT SERPL-MCNC: 0.82 MG/DL (ref 0.7–1.3)
GFR SERPL CREATININE-BSD FRML MDRD: > 90 ML/MIN/{1.73_M2} (ref 56–?)
GLUCOSE SERPL-MCNC: 158 MG/DL (ref 60–100)
HDLC SERPL-MCNC: 37 MG/DL (ref 40–?)
LDLC SERPL CALC-MCNC: 47.8 MG/DL (ref ?–100)
NONHDLC SERPL-MCNC: 70 MG/DL
POTASSIUM SERPL-SCNC: 3.9 MMOL/L (ref 3.5–5.1)
PROT SERPL-MCNC: 7.7 G/DL (ref 5.7–8.2)
SODIUM SERPL-SCNC: 141 MMOL/L (ref 136–145)
TRIGL SERPL-MCNC: 111 MG/DL (ref ?–150)